# Patient Record
Sex: FEMALE | Race: WHITE
[De-identification: names, ages, dates, MRNs, and addresses within clinical notes are randomized per-mention and may not be internally consistent; named-entity substitution may affect disease eponyms.]

---

## 2018-04-23 ENCOUNTER — HOSPITAL ENCOUNTER (INPATIENT)
Dept: HOSPITAL 7 - FB.ED | Age: 78
LOS: 7 days | Discharge: HOME HEALTH | DRG: 641 | End: 2018-04-30
Attending: FAMILY MEDICINE | Admitting: EMERGENCY MEDICINE
Payer: MEDICARE

## 2018-04-23 DIAGNOSIS — Z66: ICD-10-CM

## 2018-04-23 DIAGNOSIS — R13.10: ICD-10-CM

## 2018-04-23 DIAGNOSIS — F71: ICD-10-CM

## 2018-04-23 DIAGNOSIS — G91.9: ICD-10-CM

## 2018-04-23 DIAGNOSIS — E87.0: Primary | ICD-10-CM

## 2018-04-23 DIAGNOSIS — H35.30: ICD-10-CM

## 2018-04-23 DIAGNOSIS — G40.209: ICD-10-CM

## 2018-04-23 DIAGNOSIS — R41.82: ICD-10-CM

## 2018-04-23 DIAGNOSIS — Z87.820: ICD-10-CM

## 2018-04-23 DIAGNOSIS — G70.9: ICD-10-CM

## 2018-04-23 DIAGNOSIS — R74.8: ICD-10-CM

## 2018-04-23 DIAGNOSIS — E86.0: ICD-10-CM

## 2018-04-23 DIAGNOSIS — I50.9: ICD-10-CM

## 2018-04-23 DIAGNOSIS — I27.20: ICD-10-CM

## 2018-04-23 DIAGNOSIS — M19.90: ICD-10-CM

## 2018-04-23 DIAGNOSIS — E78.5: ICD-10-CM

## 2018-04-23 DIAGNOSIS — N17.9: ICD-10-CM

## 2018-04-23 DIAGNOSIS — R05: ICD-10-CM

## 2018-04-23 PROCEDURE — 36415 COLL VENOUS BLD VENIPUNCTURE: CPT

## 2018-04-23 PROCEDURE — 71045 X-RAY EXAM CHEST 1 VIEW: CPT

## 2018-04-23 PROCEDURE — 96360 HYDRATION IV INFUSION INIT: CPT

## 2018-04-23 PROCEDURE — 83880 ASSAY OF NATRIURETIC PEPTIDE: CPT

## 2018-04-23 PROCEDURE — 99285 EMERGENCY DEPT VISIT HI MDM: CPT

## 2018-04-23 PROCEDURE — 80048 BASIC METABOLIC PNL TOTAL CA: CPT

## 2018-04-23 PROCEDURE — 70450 CT HEAD/BRAIN W/O DYE: CPT

## 2018-04-23 PROCEDURE — 85025 COMPLETE CBC W/AUTO DIFF WBC: CPT

## 2018-04-23 RX ADMIN — Medication PRN ML: at 18:01

## 2018-04-23 NOTE — EDM.PDOC
ED HPI GENERAL MEDICAL PROBLEM





- General


Stated Complaint: NOT GETTING BETTER FROM PNEUMONIA


Time Seen by Provider: 04/23/18 17:02


Source of Information: Reports: Patient, Other (care giver)


History Limitations: Reports: Altered Mental Status





- History of Present Illness


INITIAL COMMENTS - FREE TEXT/NARRATIVE: 





77 y.o.w.evans came from a group home to the ed due due "non healing pneumonia" and 

declining mental status to thye point she does not anymore eat, drink and 

communicate. No family is present. As per care give, the family is in [place], 

and the patient is a full code.  /68 Pulse ox 96% Pulse 98 temp 97.4


Onset Date: 04/16/18


Onset Time: 08:00


Duration: Week(s):, Getting Worse


Location: Reports: Generalized (mental status)


Quality: Reports: Other


Severity: Severe


Improves with: Reports: Rest


Worsens with: Reports: Movement


Context: Reports: Other (poor fluid intake)


Associated Symptoms: Reports: Confusion, Cough, Loss of Appetite, Weakness





- Related Data


 Allergies











Allergy/AdvReac Type Severity Reaction Status Date / Time


 


No Known Allergies Allergy   Verified 04/23/18 17:47











Home Meds: 


 Home Meds





Calcium Carbonate [Calcium] 1,500 mg PO DAILY 04/23/18 [History]


Cholecalciferol (Vitamin D3) [Vitamin D3] 2,000 unit PO DAILY 04/23/18 [History]


Fexofenadine [Allegra] 180 mg PO DAILY 04/23/18 [History]


Iron/Multivits,Stress Formula [Stress Formula with Iron] 1 tab PO DAILY 04/23/ 18 [History]


Levofloxacin 750 mg PO DAILY 04/23/18 [History]


Nabumetone [Relafen] 750 mg PO 18 04/23/18 [History]


levETIRAcetam [Keppra] 500 mg PO 10,18 04/23/18 [History]











ED ROS GENERAL





- Review of Systems


Review Of Systems: Unable To Obtain (nonverbal)





- Physical Exam


Exam: See Below


Exam Limited By: Altered Mental Status


General Appearance: Lethargic, Moderate Distress


Eye Exam: Right Eye: Other (Right puplile 2 mm left pupil 4 mm)


Ears: Normal External Exam


Nose: Normal Inspection


Throat/Mouth: Normal Oropharynx, No Airway Compromise, Other (dry mucosal 

membrane)


Head Exam: Atraumatic, Normocephalic


Neck: Normal Inspection, Supple, Non-Tender


Respiratory/Chest: No Respiratory Distress, Lungs Clear, Normal Breath Sounds


Cardiovascular: Normal Peripheral Pulses, Regular Rate, Rhythm, No Edema


GI/Abdominal: Normal Bowel Sounds, Soft, Non-Tender, No Organomegaly


 (Female) Exam: Deferred


Rectal (Female) Exam: Deferred


Neuro Exam (Abbreviated): Inattentive, Other (lethargic)


Back Exam: Normal Inspection, Full Range of Motion


Extremities: Normal Inspection, Normal Range of Motion, Non-Tender, No Pedal 

Edema, Normal Capillary Refill


Psychiatric: Depressed Mood, Flat Affect


Skin Exam: Warm, Dry, Intact, Normal Color, No Rash





Course





- Vital Signs


Text/Narrative:: 


77 y.o.w.f came from a group home to the ed due due "non healing pneumonia" and 

declining mental status to thye point she does not anymore eat, drink and 

communicate. No family is present. As per care give, the family is in [place], 

and the patient is a full code.  /68 Pulse ox 96% Pulse 98 temp 97.4


PE: Thin 77 y.o.w.f with mental status change, not able to communicate, H/O 

pneumonia


Imaging: Head CT: Ventricular size increased, no other acute changes. CXR right 

lower lung infiltrate, official report is pending


Labs: WBC 12.6 HGB 15.8 Na 166  Cr 1.5 DARÍO 54 GFR 33 ProBNP 5057


Impression: Mental status changes, Hypernatremia, Hyperchloremia, dehydration, 

CRI


Tx:  NS. Levoquin i.v


Reexam: Improved some


Plan: Admit to ICU


Last Recorded V/S: 


 Last Vital Signs











Temp  35.1 C L  04/24/18 04:00


 


Pulse  100   04/23/18 23:00


 


Resp  22 H  04/24/18 07:00


 


BP  87/52 L  04/24/18 07:00


 


Pulse Ox  93 L  04/24/18 07:00














- Orders/Labs/Meds


Orders: 


 Active Orders 24 hr











 Category Date Time Status


 


 Patient Status [ADT] Routine ADT  04/23/18 18:23 Active


 


 Oxygen Therapy [RC] PRN Care  04/23/18 18:23 Active


 


 VTE/DVT Education [RC] Per Unit Routine Care  04/23/18 18:23 Active


 


 Vital Signs [RC] Q4H Care  04/23/18 18:23 Active


 


 UA W/MICROSCOPIC [URIN] Stat Lab  04/23/18 23:00 Ordered


 


 Sodium Chloride 0.9% [Saline Flush] Med  04/23/18 18:01 Active





 10 ml FLUSH ASDIRECTED PRN   


 


 Saline Lock Insert [OM.PC] Routine Oth  04/23/18 18:01 Ordered








 Medication Orders





Levofloxacin/Dextrose 750 mg/ (Premix)  150 mls @ 100 mls/hr IV Q48H [facility]


   Last Admin: 04/24/18 09:35  Dose: 100 mls/hr


Sodium Chloride (Normal Saline)  1,000 mls @ 100 mls/hr IV ASDIRECTED CHAZ


   Last Admin: 04/24/18 08:40  Dose: 100 mls/hr


Levetiracetam (Keppra)  500 mg PO 10,18 [facility]


   Last Admin: 04/24/18 10:16  Dose: 500 mg


Sodium Chloride (Saline Flush)  10 ml FLUSH ASDIRECTED PRN


   PRN Reason: Keep Vein Open


   Last Admin: 04/23/18 18:01  Dose: 10 ml








Labs: 


 Laboratory Tests











  04/23/18 04/23/18 04/23/18 Range/Units





  17:58 17:58 17:58 


 


WBC  12.1 H    (4.5-12.0)  X10-3/uL


 


RBC  5.45 H    (3.23-5.20)  x10(6)uL


 


Hgb  15.8 H    (11.5-15.5)  g/dL


 


Hct  49.9    (30.0-51.3)  %


 


MCV  91.6    (80-96)  fL


 


MCH  29.0    (27.7-33.6)  pg


 


MCHC  31.6 L    (32.2-35.4)  g/dL


 


RDW  14.5    (11.5-15.5)  %


 


Plt Count  176    (125-369)  X10(3)uL


 


MPV  10.6 H    (7.4-10.4)  fL


 


Neut % (Auto)  74.0    (46-82)  %


 


Lymph % (Auto)  14.0    (13-37)  %


 


Mono % (Auto)  8.5    (4-12)  %


 


Eos % (Auto)  3    (1.0-5.0)  %


 


Baso % (Auto)  1    (0-2)  %


 


Neut # (Auto)  9.0 H    (1.6-8.3)  #


 


Lymph # (Auto)  1.7    (0.6-5.0)  #


 


Mono # (Auto)  1.0    (0.0-1.3)  #


 


Eos # (Auto)  0.3    (0.0-0.8)  #


 


Baso # (Auto)  0.1    (0.0-0.2)  #


 


Sodium   166 H*   (135-145)  mmol/L


 


Potassium   4.0   (3.5-5.3)  mmol/L


 


Chloride   125 H*   (100-110)  mmol/L


 


Carbon Dioxide   30   (21-32)  mmol/L


 


BUN   54 H   (7-18)  mg/dL


 


Creatinine   1.5 H   (0.55-1.02)  mg/dL


 


Est Cr Clr Drug Dosing   TNP   


 


Estimated GFR (MDRD)   33 L   (>60)  


 


BUN/Creatinine Ratio   36.0 H   (9-20)  


 


Glucose   98   ()  mg/dL


 


Calcium   10.7 H   (8.6-10.2)  mg/dL


 


NT-Pro-B Natriuret Pep    5405 H*  (<=450)  pg/mL











Meds: 


Medications











Generic Name Dose Route Start Last Admin





  Trade Name Freq  PRN Reason Stop Dose Admin


 


Levofloxacin/Dextrose 750 mg/  150 mls @ 100 mls/hr  04/24/18 09:00  04/24/18 09

:35





  Premix  IV   100 mls/hr





  Q48H CHAZ   Administration





     





     





     





     


 


Sodium Chloride  1,000 mls @ 100 mls/hr  04/24/18 08:45  04/24/18 08:40





  Normal Saline  IV   100 mls/hr





  ASDIRECTED CHAZ   Administration





     





     





     





     


 


Levetiracetam  500 mg  04/24/18 10:00  04/24/18 10:16





  Keppra  PO   500 mg





  10,18 CHAZ   Administration





     





     





     





     


 


Sodium Chloride  10 ml  04/23/18 18:01  04/23/18 18:01





  Saline Flush  FLUSH   10 ml





  ASDIRECTED PRN   Administration





  Keep Vein Open   





     





     





     














Discontinued Medications














Generic Name Dose Route Start Last Admin





  Trade Name Freq  PRN Reason Stop Dose Admin


 


Sodium Chloride  1,000 mls @ 999 mls/hr  04/23/18 17:25  04/23/18 18:00





  Normal Saline  IV  04/23/18 18:25  999 mls/hr





  .BOLUS ONE   Administration





     





     





     





     


 


Sodium Chloride  1,000 mls @ 999 mls/hr  04/23/18 18:30  04/23/18 19:04





  Sodium Chloride 0.45%  IV   999 mls/hr





  ASDIRECTED CHAZ   Administration





     





     





     





     


 


Sodium Chloride  1,000 mls @ 125 mls/hr  04/23/18 20:00  04/24/18 04:33





  Sodium Chloride 0.45%  IV   125 mls/hr





  ASDIRECTED CHAZ   Administration





     





     





     





     














Departure





- Departure


Time of Disposition: 22:00


Disposition: Admitted As Inpatient 64


Condition: Fair


Clinical Impression: 


 Hypernatremia








- Discharge Information





- My Orders


Last 24 Hours: 


My Active Orders





04/23/18 18:01


Sodium Chloride 0.9% [Saline Flush]   10 ml FLUSH ASDIRECTED PRN 


Saline Lock Insert [OM.PC] Routine 





04/23/18 18:23


Patient Status [ADT] Routine 


Oxygen Therapy [RC] PRN 


VTE/DVT Education [RC] Per Unit Routine 


Vital Signs [RC] Q4H 





04/23/18 23:00


UA W/MICROSCOPIC [URIN] Stat 














- Assessment/Plan


Last 24 Hours: 


My Active Orders





04/23/18 18:01


Sodium Chloride 0.9% [Saline Flush]   10 ml FLUSH ASDIRECTED PRN 


Saline Lock Insert [OM.PC] Routine 





04/23/18 18:23


Patient Status [ADT] Routine 


Oxygen Therapy [RC] PRN 


VTE/DVT Education [RC] Per Unit Routine 


Vital Signs [RC] Q4H 





04/23/18 23:00


UA W/MICROSCOPIC [URIN] Stat

## 2018-04-24 RX ADMIN — HEPARIN SODIUM SCH UNITS: 5000 INJECTION, SOLUTION INTRAVENOUS; SUBCUTANEOUS at 14:35

## 2018-04-24 NOTE — PCM.HP
H&P History of Present Illness





- General


Date of Service: 04/24/18


Admit Problem/Dx: 


 Admission Diagnosis/Problem





Admission Diagnosis/Problem      Hypernatremia








Source of Information: Family, Old Records, Provider





- History of Present Illness


Initial Comments - Free Text/Narative: 


Chief complaint: Altered mental status, hypernatremia





Patient is a 76 yo mentally handicapped female with complex partial epilepsy 

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 

fatigue and confusion.  A urinalysis was ordered but not collected and while 

waiting on this patient was brought into the clinic on 4/20 and seen by a 

provider. She wasn't eating well, was not as responsive, they had a difficult 

time collecting the urine sample and when brought into the clinic her blood 

pressure was 96/70 with a pulse of 102. Was afebrile but had rales on the right 

lower lobe. Blood work showed a white count of almost 17,000, hemoglobin 16.4, 

significant left shift of 14.4. Test x-ray was done and showed possible 

bibasilar pneumonia, nodular mass in the mediastinum on the right. CT scan was 

recommended for further evaluation. Patient was treated with levofloxacin 750 

mg by mouth daily for 5 days and discharged from the clinic back to the 

facility. She presented to the emergency department on the day of admission 

with increased lethargy, not taking by mouth intake, essentially fairly 

nonresponsive. The patient was found to have a sodium of 166, creatinine of 1.5

, and was admitted for severe dehydration and hypernatremia.





Patient normally can speak but is not oriented in her speech. She is 

interactive with caregivers. She is nonambulatory at the facility. She 

sometimes takes with her brother on the phone but is unable to have a 

conversation. Usually is able to eat with assistance and drink with assistance.





Past mental history:


#1 hyperlipidemia


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 

injury as a child.


#3 Hydrocephalus with a shunt in place


#4 Epilepsy, complex partial as a result of head injury.


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


#6 Osteoarthritis


 #7 Macular degeneration





Social history:


Patient lives in a group home. Her brother Leopoldo is her guardian and power of 

. She previously lived with her mother up until her mother passed away 

about 10 years ago. Nonsmoker, nondrinker. Single, no children.





Family history:


Noncontributory











- Related Data


Allergies/Adverse Reactions: 


 Allergies











Allergy/AdvReac Type Severity Reaction Status Date / Time


 


No Known Allergies Allergy   Verified 04/23/18 17:47











Home Medications: 


 Home Meds





Calcium Carbonate [Calcium] 1,500 mg PO DAILY 04/23/18 [History]


Cholecalciferol (Vitamin D3) [Vitamin D3] 2,000 unit PO DAILY 04/23/18 [History]


Fexofenadine [Allegra] 180 mg PO DAILY 04/23/18 [History]


Iron/Multivits,Stress Formula [Stress Formula with Iron] 1 tab PO DAILY 04/23/ 18 [History]


Levofloxacin 750 mg PO DAILY 04/23/18 [History]


Nabumetone [Relafen] 750 mg PO 18 04/23/18 [History]


levETIRAcetam [Keppra] 500 mg PO 10,18 04/23/18 [History]











Past Medical History


HEENT History: Reports: Cataract, Macular Degeneration, Other (See Below)


Other HEENT History: ptosis of the eyelid


Cardiovascular History: Reports: High Cholesterol, Other (See Below)


Other Cardiovascular History: tachacardia


Respiratory History: Reports: Pulmonary Fibrosis


Genitourinary History: Reports: Urinary Incontinence


Musculoskeletal History: Reports: Arthritis, Osteoporosis


Neurological History: Reports: Seizure, Other (See Below)


Other Neuro History: Hydrocephalus


Psychiatric History: Reports: Developmental Delay





Social & Family History





- Family History


Family Medical History: Noncontributory





- Tobacco Use


Smoking Status *Q: Unknown Ever Smoked


Second Hand Smoke Exposure: No





- Caffeine Use


Caffeine Use: Reports: Other


Other Caffeine Use: unknown





- Recreational Drug Use


Recreational Drug Use: No





H&P Review of Systems





- Review of Systems:


Review Of Systems: Unable To Obtain (See ROS)





Exam





- Exam


Exam: See Below





- Vital Signs


Vital Signs: 


 Last Vital Signs











Temp  35.6 C   04/24/18 08:00


 


Pulse  99   04/24/18 11:00


 


Resp  20   04/24/18 11:00


 


BP  99/60   04/24/18 11:00


 


Pulse Ox  91 L  04/24/18 11:00











Weight: 48.807 kg





- Exam


General: Lethargic


HEENT: PERRLA, Conjunctiva Clear (mucous membranes dry.  Eyes somewhat sunken.  

Does smile and make eye contact but doesn't verbally respond.  Doesn't follow 

commands but does move spontaneously.  Asymmetric smile with mild droop on the 

left. )


Neck: Supple


Lungs: Clear to Auscultation, Normal Respiratory Effort


Cardiovascular: Regular Rate, Regular Rhythm, Normal S1, Normal S2


GI/Abdominal Exam: Normal Bowel Sounds, Soft, Non-Tender, No Distention


Extremities: No Pedal Edema





- Patient Data


Lab Results Last 24 hrs: 


 Laboratory Results - last 24 hr











  04/23/18 04/23/18 04/23/18 Range/Units





  17:58 17:58 17:58 


 


WBC  12.1 H    (4.5-12.0)  X10-3/uL


 


RBC  5.45 H    (3.23-5.20)  x10(6)uL


 


Hgb  15.8 H    (11.5-15.5)  g/dL


 


Hct  49.9    (30.0-51.3)  %


 


MCV  91.6    (80-96)  fL


 


MCH  29.0    (27.7-33.6)  pg


 


MCHC  31.6 L    (32.2-35.4)  g/dL


 


RDW  14.5    (11.5-15.5)  %


 


Plt Count  176    (125-369)  X10(3)uL


 


MPV  10.6 H    (7.4-10.4)  fL


 


Neut % (Auto)  74.0    (46-82)  %


 


Lymph % (Auto)  14.0    (13-37)  %


 


Mono % (Auto)  8.5    (4-12)  %


 


Eos % (Auto)  3    (1.0-5.0)  %


 


Baso % (Auto)  1    (0-2)  %


 


Neut # (Auto)  9.0 H    (1.6-8.3)  #


 


Lymph # (Auto)  1.7    (0.6-5.0)  #


 


Mono # (Auto)  1.0    (0.0-1.3)  #


 


Eos # (Auto)  0.3    (0.0-0.8)  #


 


Baso # (Auto)  0.1    (0.0-0.2)  #


 


Sodium   166 H*   (135-145)  mmol/L


 


Potassium   4.0   (3.5-5.3)  mmol/L


 


Chloride   125 H*   (100-110)  mmol/L


 


Carbon Dioxide   30   (21-32)  mmol/L


 


BUN   54 H   (7-18)  mg/dL


 


Creatinine   1.5 H   (0.55-1.02)  mg/dL


 


Est Cr Clr Drug Dosing   TNP   


 


Estimated GFR (MDRD)   33 L   (>60)  


 


BUN/Creatinine Ratio   36.0 H   (9-20)  


 


Glucose   98   ()  mg/dL


 


Calcium   10.7 H   (8.6-10.2)  mg/dL


 


Total Bilirubin     (0.1-1.3)  mg/dL


 


AST     (5-25)  IU/L


 


ALT     (12-36)  U/L


 


Alkaline Phosphatase     ()  IU/L


 


Troponin I     (<0.017-0.056)  ng/mL


 


NT-Pro-B Natriuret Pep    5405 H*  (<=450)  pg/mL


 


Total Protein     (6.0-8.0)  g/dL


 


Albumin     (3.2-4.6)  g/dL


 


Globulin     g/dL


 


Albumin/Globulin Ratio     


 


Urine Color     (YELLOW)  


 


Urine Appearance     (CLEAR)  


 


Urine pH     (5.0-6.5)  


 


Ur Specific Gravity     (1.010-1.025)  


 


Urine Protein     (NEGATIVE)  mg/dL


 


Urine Glucose (UA)     (NEGATIVE)  mg/dL


 


Urine Ketones     (NEGATIVE)  mg/dL


 


Urine Occult Blood     (NEGATIVE)  


 


Urine Nitrite     (NEGATIVE)  


 


Urine Bilirubin     (NEGATIVE)  


 


Urine Urobilinogen     (NEGATIVE)  mg/dL


 


Ur Leukocyte Esterase     (NEGATIVE)  


 


Urine RBC     (0)  


 


Urine WBC     (0)  


 


Ur Squamous Epith Cells     (NS,R,O)  


 


Amorphous Sediment     


 


Urine Bacteria     (NS)  














  04/23/18 04/24/18 04/24/18 Range/Units





  23:00 08:00 08:00 


 


WBC   8.4   (4.5-12.0)  X10-3/uL


 


RBC   4.41   (3.23-5.20)  x10(6)uL


 


Hgb   13.3   (11.5-15.5)  g/dL


 


Hct   40.3   (30.0-51.3)  %


 


MCV   91.2   (80-96)  fL


 


MCH   30.0   (27.7-33.6)  pg


 


MCHC   32.9   (32.2-35.4)  g/dL


 


RDW   14.6   (11.5-15.5)  %


 


Plt Count   116 L   (125-369)  X10(3)uL


 


MPV   10.0   (7.4-10.4)  fL


 


Neut % (Auto)   68.4   (46-82)  %


 


Lymph % (Auto)   18.0   (13-37)  %


 


Mono % (Auto)   6.5   (4-12)  %


 


Eos % (Auto)   7 H   (1.0-5.0)  %


 


Baso % (Auto)   1   (0-2)  %


 


Neut # (Auto)   5.8   (1.6-8.3)  #


 


Lymph # (Auto)   1.5   (0.6-5.0)  #


 


Mono # (Auto)   0.5   (0.0-1.3)  #


 


Eos # (Auto)   0.6   (0.0-0.8)  #


 


Baso # (Auto)   0.0   (0.0-0.2)  #


 


Sodium    154 H D  (135-145)  mmol/L


 


Potassium    3.8  (3.5-5.3)  mmol/L


 


Chloride    121 H*  (100-110)  mmol/L


 


Carbon Dioxide    24  (21-32)  mmol/L


 


BUN    38 H D  (7-18)  mg/dL


 


Creatinine    1.1 H  (0.55-1.02)  mg/dL


 


Est Cr Clr Drug Dosing    29.79  


 


Estimated GFR (MDRD)    48 L  (>60)  


 


BUN/Creatinine Ratio    34.5 H  (9-20)  


 


Glucose    85  ()  mg/dL


 


Calcium    8.4 L D  (8.6-10.2)  mg/dL


 


Total Bilirubin    0.8  (0.1-1.3)  mg/dL


 


AST    < 5 L  (5-25)  IU/L


 


ALT    31  (12-36)  U/L


 


Alkaline Phosphatase    76  ()  IU/L


 


Troponin I     (<0.017-0.056)  ng/mL


 


NT-Pro-B Natriuret Pep     (<=450)  pg/mL


 


Total Protein    5.4 L  (6.0-8.0)  g/dL


 


Albumin    2.3 L  (3.2-4.6)  g/dL


 


Globulin    3.1  g/dL


 


Albumin/Globulin Ratio    0.7  


 


Urine Color  Yellow    (YELLOW)  


 


Urine Appearance  Clear    (CLEAR)  


 


Urine pH  5.0    (5.0-6.5)  


 


Ur Specific Gravity  1.020    (1.010-1.025)  


 


Urine Protein  Negative    (NEGATIVE)  mg/dL


 


Urine Glucose (UA)  Normal    (NEGATIVE)  mg/dL


 


Urine Ketones  Negative    (NEGATIVE)  mg/dL


 


Urine Occult Blood  Negative    (NEGATIVE)  


 


Urine Nitrite  Negative    (NEGATIVE)  


 


Urine Bilirubin  Negative    (NEGATIVE)  


 


Urine Urobilinogen  Normal    (NEGATIVE)  mg/dL


 


Ur Leukocyte Esterase  Negative    (NEGATIVE)  


 


Urine RBC  0-5    (0)  


 


Urine WBC  0-5    (0)  


 


Ur Squamous Epith Cells  Few H    (NS,R,O)  


 


Amorphous Sediment  Few    


 


Urine Bacteria  Few H    (NS)  














  04/24/18 Range/Units





  08:00 


 


WBC   (4.5-12.0)  X10-3/uL


 


RBC   (3.23-5.20)  x10(6)uL


 


Hgb   (11.5-15.5)  g/dL


 


Hct   (30.0-51.3)  %


 


MCV   (80-96)  fL


 


MCH   (27.7-33.6)  pg


 


MCHC   (32.2-35.4)  g/dL


 


RDW   (11.5-15.5)  %


 


Plt Count   (125-369)  X10(3)uL


 


MPV   (7.4-10.4)  fL


 


Neut % (Auto)   (46-82)  %


 


Lymph % (Auto)   (13-37)  %


 


Mono % (Auto)   (4-12)  %


 


Eos % (Auto)   (1.0-5.0)  %


 


Baso % (Auto)   (0-2)  %


 


Neut # (Auto)   (1.6-8.3)  #


 


Lymph # (Auto)   (0.6-5.0)  #


 


Mono # (Auto)   (0.0-1.3)  #


 


Eos # (Auto)   (0.0-0.8)  #


 


Baso # (Auto)   (0.0-0.2)  #


 


Sodium   (135-145)  mmol/L


 


Potassium   (3.5-5.3)  mmol/L


 


Chloride   (100-110)  mmol/L


 


Carbon Dioxide   (21-32)  mmol/L


 


BUN   (7-18)  mg/dL


 


Creatinine   (0.55-1.02)  mg/dL


 


Est Cr Clr Drug Dosing   


 


Estimated GFR (MDRD)   (>60)  


 


BUN/Creatinine Ratio   (9-20)  


 


Glucose   ()  mg/dL


 


Calcium   (8.6-10.2)  mg/dL


 


Total Bilirubin   (0.1-1.3)  mg/dL


 


AST   (5-25)  IU/L


 


ALT   (12-36)  U/L


 


Alkaline Phosphatase   ()  IU/L


 


Troponin I  0.239 H*  (<0.017-0.056)  ng/mL


 


NT-Pro-B Natriuret Pep   (<=450)  pg/mL


 


Total Protein   (6.0-8.0)  g/dL


 


Albumin   (3.2-4.6)  g/dL


 


Globulin   g/dL


 


Albumin/Globulin Ratio   


 


Urine Color   (YELLOW)  


 


Urine Appearance   (CLEAR)  


 


Urine pH   (5.0-6.5)  


 


Ur Specific Gravity   (1.010-1.025)  


 


Urine Protein   (NEGATIVE)  mg/dL


 


Urine Glucose (UA)   (NEGATIVE)  mg/dL


 


Urine Ketones   (NEGATIVE)  mg/dL


 


Urine Occult Blood   (NEGATIVE)  


 


Urine Nitrite   (NEGATIVE)  


 


Urine Bilirubin   (NEGATIVE)  


 


Urine Urobilinogen   (NEGATIVE)  mg/dL


 


Ur Leukocyte Esterase   (NEGATIVE)  


 


Urine RBC   (0)  


 


Urine WBC   (0)  


 


Ur Squamous Epith Cells   (NS,R,O)  


 


Amorphous Sediment   


 


Urine Bacteria   (NS)  











Result Diagrams: 


 04/24/18 08:00





 04/24/18 08:00


Imaging Impressions Last 24 hrs: 


I ordered a chest CT this morning to further evaluate for possible infectious 

etiology and to look at this area of a mass in the right suprahilar region. 

Final report showed pulmonary artery dilatation which is causing the appearance 

of a mass, suggest pulmonary hypertension. ASHD with mild cardiomegaly. 

Multiple areas of heavy markings in the lungs, likely fibrotic in nature, 

although minimal patchy pneumonia is difficult to entirely exclude in some of 

these areas. No gross consolidating pneumonia or effusion seen.








EKG INTERPRETATION


EKG Date: 04/24/18


Rhythm: NSR


EKG Interpretation Comments: 


Normal sinus rhythm, rate 96, no ST elevation or depression but the patient has 

flipped T waves across all leads. The QTc interval prolonged at 495. The 

patient has IVCD. No prior for comparison.








- Problem List


(1) Hypernatremia


SNOMED Code(s): 98613487


   ICD Code: E87.0 - HYPEROSMOLALITY AND HYPERNATREMIA   Status: Acute   

Current Visit: Yes   Problem Details: And has severe dehydration and free water 

deficit. She was started on normal saline in the emergency department and then 

changed to half normal saline after fluid bolus. Given that she has already 

dropped about 12 points in about 12 hours I'm going to put her back on normal 

saline and continue fluids at 100 mL an hour to slow the decline of her sodium. 

Patient has already started to improve her mental status and she is now 

arousable and making some sounds although not speaking. Recheck sodium at 1600.

   





(2) Pneumonia


SNOMED Code(s): 233002891


   ICD Code: J18.9 - PNEUMONIA, UNSPECIFIED ORGANISM   Status: Acute   Current 

Visit: Yes   Problem Details: Patient has on CT question of patchy infiltrate 

that has been treated already with 5 days of Levaquin. I'm going to hold any 

further antibiotic therapy.   





(3) Elevated troponin


SNOMED Code(s): 672519511, 416857671, 740277371


   ICD Code: R74.8 - ABNORMAL LEVELS OF OTHER SERUM ENZYMES   Status: Acute   

Current Visit: Yes   Problem Details: Certainly non-ST elevation MI could be 

the potential etiology for the patient's initial presentation of lethargy. We'

ll repeat troponin this afternoon. I am going to start the patient on low-dose 

beta blocker metoprolol 12.5 mg and aspirin.   





(4) Seizure disorder, complex partial


SNOMED Code(s): 480559547


   ICD Code: G40.209 - LOCAL-REL SYMPTC EPI W CMPLX PRT SEIZ,NOT NTRCT,W/O STAT 

EPI   Status: Acute   Current Visit: Yes   Problem Details: Continue keppra at 

outpatient dose.   


Qualifiers: 


   Epilepsy type: partial symptomatic 





(5) Acute kidney injury


SNOMED Code(s): 78307360


   ICD Code: N17.9 - ACUTE KIDNEY FAILURE, UNSPECIFIED   Status: Acute   

Current Visit: Yes   Problem Details: Baseline creatinine 0.94 but hasn't been 

checked since 3/2017.  Monitor.   





(6) Moderate mental retardation


SNOMED Code(s): 923410673, 977530475


   ICD Code: F71 - MODERATE INTELLECTUAL DISABILITIES   Status: Acute   Current 

Visit: Yes   Problem Details: Work with staff at group home to learn how to 

meet patient's psychosocial and physical needs.     





(7) DVT prophylaxis


SNOMED Code(s): 089675565, 013294004


   ICD Code: SPB1835 -    Status: Acute   Current Visit: Yes   Problem Details: 

Heparin BID due to renal injury.  SCDs.   


Problem List Initiated/Reviewed/Updated: Yes


Orders Last 24hrs: 


 Active Orders 24 hr











 Category Date Time Status


 


 Patient Status [ADT] Routine ADT  04/23/18 18:23 Active


 


 Patient Status [ADT] Routine ADT  04/23/18 18:59 Active


 


 Activity as Tolerated [RC] 08,12,18 Care  04/24/18 10:13 Active


 


 Cardiac Monitoring [RC] CONTINUOUS Care  04/23/18 19:02 Active


 


 Height and Weight [RC] DAILY Care  04/24/18 13:47 Ordered


 


 Intake and Output [RC] QSHIFT Care  04/24/18 13:48 Ordered


 


 Oxygen Therapy [RC] PRN Care  04/23/18 18:23 Active


 


 Oxygen Therapy [RC] PRN Care  04/23/18 18:59 Active


 


 Oxygen Therapy [RC] PRN Care  04/24/18 13:47 Ordered


 


 Pulse Oximetry [RC] PRN Care  04/24/18 13:49 Ordered


 


 Up ad Caron [RC] ASDIRECTED Care  04/24/18 13:47 Ordered


 


 Up to Chair [RC] ASDIRECTED Care  04/24/18 13:47 Ordered


 


 VTE/DVT Education [RC] Per Unit Routine Care  04/23/18 18:23 Active


 


 VTE/DVT Education [RC] Per Unit Routine Care  04/23/18 18:59 Active


 


 VTE/DVT Education [RC] Per Unit Routine Care  04/24/18 13:47 Ordered


 


 Vital Signs [RC] Q4H Care  04/23/18 18:23 Active


 


 Vital Signs [RC] Q4H Care  04/24/18 13:47 Ordered


 


 Mechanical Soft Diet [DIET] Diet  04/24/18 Breakfast Ordered


 


 BASIC METABOLIC PANEL,BMP [CHEM] AM Lab  04/25/18 05:11 Ordered


 


 CBC WITH AUTO DIFF [HEME] AM Lab  04/25/18 05:11 Ordered


 


 UA W/MICROSCOPIC [URIN] Stat Lab  04/23/18 23:00 Ordered


 


 Heparin Sodium Med  04/24/18 14:00 Ordered





 5,000 units SUBCUT Q12H   


 


 Levofloxacin/Dextrose 5%-Water [Levaquin in D5W 750 MG/ Med  04/24/18 09:00 

Active





 150 ML] 750 mg   





 Premix Bag 1 bag   





 IV Q48H   


 


 Sodium Chloride 0.9% [Normal Saline] 1,000 ml Med  04/24/18 08:45 Active





 IV ASDIRECTED   


 


 Sodium Chloride 0.9% [Saline Flush] Med  04/23/18 18:01 Active





 10 ml FLUSH ASDIRECTED PRN   


 


 levETIRAcetam [Keppra] Med  04/24/18 10:00 Active





 500 mg PO 10,18   


 


 Saline Lock Insert [OM.PC] Routine Oth  04/23/18 18:01 Ordered


 


 Sequential Compression Device [OM.PC] Per Unit Routine Oth  04/24/18 13:49 

Ordered


 


 Resuscitation Status Routine Resus Stat  04/24/18 13:47 Ordered


 


 EKG 12 Lead [EK] Stat Ther  04/24/18 08:10 Ordered








 Medication Orders





Levofloxacin/Dextrose 750 mg/ (Premix)  150 mls @ 100 mls/hr IV Q48H [facility]


   Last Admin: 04/24/18 09:35  Dose: 100 mls/hr


Sodium Chloride (Normal Saline)  1,000 mls @ 100 mls/hr IV ASDIRECTED [facility]


   Last Admin: 04/24/18 08:40  Dose: 100 mls/hr


Levetiracetam (Keppra)  500 mg PO 10,18 [facility]


   Last Admin: 04/24/18 10:16  Dose: 500 mg


Sodium Chloride (Saline Flush)  10 ml FLUSH ASDIRECTED PRN


   PRN Reason: Keep Vein Open


   Last Admin: 04/23/18 18:01  Dose: 10 ml

## 2018-04-24 NOTE — CT
INDICATION:  Mental status changes.  



CT HEAD WITHOUT CONTRAST:  Serial contiguous 2.5 and 5-mm sections were 
obtained through the brain without contrast, 04/23/2018, and compared with 06/23
/2009.



Total Exam DLP = 1094.32 mGy-cm. 



A shunt tube is again noted in place at the foramen magnum extending through 
the posterior fossa, into the posteroparietal area and then into the temporal 
area on the left.  



No other cranial abnormality was identified.  There is grove for the tube in 
the occipital bone.  



There appears to be a small air-fluid level in the left maxillary antrum.  
Paranasal sinuses were otherwise unremarkable.  Mastoid air cells are very 
minimal in number. 



Degenerative changes are noted at the atlantoodontoid joint.  



Calcifications are noted in the vertebral and internal carotid arteries as 
previously, and appear more prominent at this time.  



Massively prominent lateral ventricles are again noted, much larger on the left 
than right, with the termination of the shunt tube at the temporal horn of the 
left lateral ventricle.  No shift of midline structures was identified.  A 
slight increase in size of the lateral ventricles is suggested compared with 
the previous study of 2009.  Cortical sulci appear similar.  No other 
significant interval change or acute process, such as a bleeding site or 
hematoma could be identified.  



IMPRESSION:  

1.  No definite acute abnormality.  However, the lateral ventricles appear to 
be slightly more prominent than on the previous study.  

2.  Shunt tube remains in place.

3.  Cerebrovascular disease is present with arterial calcifications slightly 
more prominent in the vertebral and internal carotid arteries.  

4.  Question sinusitis left maxillary antrum.  



Report was called to Dr. Sherwood at 1833 hours, 04/23/2018

[facility]

## 2018-04-24 NOTE — CT
INDICATION:  Abnormal chest x-ray, non-resolving pneumonia.  Chronic renal 
failure. 



COMPUTERIZED TOMOGRAPHY OF THE CHEST WITHOUT CONTRAST:  Spiral 2.5-mm axial 
sections were obtained through the chest with sagittal and coronal 
reconstructions, 04/24/2018.  Comparison chest x-ray from 04/23/2018 is noted.  



Total Exam DLP = 256.59 mGy-cm. 



Moderate apical scarring is noted on the right, minimal on the left.  On the 
right, the scarring extends into the suprahilar area.  



What appears to be fibrosis is noted in the lingula.  The possibility of a mass 
in that area is difficult to entirely exclude, but is felt to be less likely 
than fibrosis.  



There also appears to be fibrosis in the middle lobe of mild degree.  Minimal 
patchy pneumonia is difficult to entirely exclude in some of these areas of 
probable fibrosis.  However, no consolidating pneumonia or effusion was 
identified.  



No nodular masses are noted in the lungs, concepcion, or mediastinum.  The appearance 
of a mass in the area of the azygos node - suprahilar on the right - is 
produced by a dilated right main pulmonary artery, which measured 32 mm 
transversely.  The left is also dilated, measuring 34 mm.  



The main pulmonary artery measured 42 mm, which is also significantly enlarged, 
the upper limits of normal being approximately 29 mm.  No mediastinal masses 
were seen. 



Calcifications are noted in the aorta, which is normal in caliber.  



Calcifications are also noted in coronary arteries with suggestion of mild 
cardiac enlargement.  No gross abnormality is noted in the upper abdomen 
included on the study.  



IMPRESSION: 

1.  Pulmonary artery dilatation responsible for the appearance of a mass in the 
right suprahilar - azygos node area.  Pulmonary hypertension is suggested with 
this appearance.  Exact etiology is indeterminate.  

2.  ASHD with mild cardiomegaly.

3.  Multiple areas of heavy markings in the lungs, likely fibrotic in nature, 
although minimal patchy pneumonia is difficult to entirely exclude in some of 
these areas.  No gross consolidating pneumonia or effusion was seen.  

4.  ASD.  
MTDD

## 2018-04-24 NOTE — CR
INDICATION:  Mental status changes.  



CHEST:  Portable AP upright view of the chest 04/23/2018 was compared with 04/20
/2018 image from the clinic and revealed an appearance of increased prominence 
of upper lung field pulmonary vasculature, raising question of a mild degree of 
CHF or pulmonary vascular congestion from other etiology.  The heart does 
appear to be somewhat enlarged, compatible with CHF.  



The aorta is tortuous and calcified in the arch area.  



Mass density is noted at the azygos node area.  This is unchanged.  



IMPRESSION: 

1.  ASHD with cardiomegaly, possible mild or early CHF.  Minimal interstitial 
lung edema versus fibrosis also. 

2.  ? Large mass at the azygos node area - suprahilar on the right - neoplasia  
vs. Pulmonary artery dilatation.  
MTDD

## 2018-04-25 RX ADMIN — HEPARIN SODIUM SCH UNITS: 5000 INJECTION, SOLUTION INTRAVENOUS; SUBCUTANEOUS at 01:28

## 2018-04-25 NOTE — PCM.PN
- General Info


Date of Service: 04/25/18


Subjective Update: 


Patient much improved today with mental status. She responds verbally to me 

although I don't understand what she is saying all the time. She talks about 

having photos taken to give pictures to the children. She does not follow 

commands but is smiling and cheerful.








- Patient Data


Vitals - Most Recent: 


 Last Vital Signs











Temp  35.4 C   04/25/18 04:00


 


Pulse  75   04/25/18 04:00


 


Resp  20   04/25/18 04:00


 


BP  96/64   04/25/18 06:30


 


Pulse Ox  97   04/25/18 04:00











Weight - Most Recent: 49.714 kg


I&O - Last 24 Hours: 


 Intake & Output











 04/24/18 04/25/18 04/25/18





 22:59 06:59 14:59


 


Intake Total 790 810 


 


Output Total 325 496 


 


Balance 465 314 











Lab Results Last 24 Hours: 


 Laboratory Results - last 24 hr











  04/24/18 04/24/18 04/24/18 Range/Units





  08:00 08:00 08:00 


 


WBC  8.4    (4.5-12.0)  X10-3/uL


 


RBC  4.41    (3.23-5.20)  x10(6)uL


 


Hgb  13.3    (11.5-15.5)  g/dL


 


Hct  40.3    (30.0-51.3)  %


 


MCV  91.2    (80-96)  fL


 


MCH  30.0    (27.7-33.6)  pg


 


MCHC  32.9    (32.2-35.4)  g/dL


 


RDW  14.6    (11.5-15.5)  %


 


Plt Count  116 L    (125-369)  X10(3)uL


 


MPV  10.0    (7.4-10.4)  fL


 


Neut % (Auto)  68.4    (46-82)  %


 


Lymph % (Auto)  18.0    (13-37)  %


 


Mono % (Auto)  6.5    (4-12)  %


 


Eos % (Auto)  7 H    (1.0-5.0)  %


 


Baso % (Auto)  1    (0-2)  %


 


Neut # (Auto)  5.8    (1.6-8.3)  #


 


Lymph # (Auto)  1.5    (0.6-5.0)  #


 


Mono # (Auto)  0.5    (0.0-1.3)  #


 


Eos # (Auto)  0.6    (0.0-0.8)  #


 


Baso # (Auto)  0.0    (0.0-0.2)  #


 


Sodium   154 H D   (135-145)  mmol/L


 


Potassium   3.8   (3.5-5.3)  mmol/L


 


Chloride   121 H*   (100-110)  mmol/L


 


Carbon Dioxide   24   (21-32)  mmol/L


 


BUN   38 H D   (7-18)  mg/dL


 


Creatinine   1.1 H   (0.55-1.02)  mg/dL


 


Est Cr Clr Drug Dosing   29.79   mL/min


 


Estimated GFR (MDRD)   48 L   (>60)  


 


BUN/Creatinine Ratio   34.5 H   (9-20)  


 


Glucose   85   ()  mg/dL


 


Calcium   8.4 L D   (8.6-10.2)  mg/dL


 


Total Bilirubin   0.8   (0.1-1.3)  mg/dL


 


AST   < 5 L   (5-25)  IU/L


 


ALT   31   (12-36)  U/L


 


Alkaline Phosphatase   76   ()  IU/L


 


Troponin I    0.239 H*  (<0.017-0.056)  ng/mL


 


Total Protein   5.4 L   (6.0-8.0)  g/dL


 


Albumin   2.3 L   (3.2-4.6)  g/dL


 


Globulin   3.1   g/dL


 


Albumin/Globulin Ratio   0.7   














  04/24/18 04/24/18 04/25/18 Range/Units





  16:05 16:05 06:00 


 


WBC    7.6  (4.5-12.0)  X10-3/uL


 


RBC    4.16  (3.23-5.20)  x10(6)uL


 


Hgb    12.6  (11.5-15.5)  g/dL


 


Hct    37.6  (30.0-51.3)  %


 


MCV    90.4  (80-96)  fL


 


MCH    30.3  (27.7-33.6)  pg


 


MCHC    33.5  (32.2-35.4)  g/dL


 


RDW    14.4  (11.5-15.5)  %


 


Plt Count    106 L  (125-369)  X10(3)uL


 


MPV    11.0 H  (7.4-10.4)  fL


 


Neut % (Auto)    70.1  (46-82)  %


 


Lymph % (Auto)    17.1  (13-37)  %


 


Mono % (Auto)    7.5  (4-12)  %


 


Eos % (Auto)    5  (1.0-5.0)  %


 


Baso % (Auto)    0  (0-2)  %


 


Neut # (Auto)    5.3  (1.6-8.3)  #


 


Lymph # (Auto)    1.3  (0.6-5.0)  #


 


Mono # (Auto)    0.6  (0.0-1.3)  #


 


Eos # (Auto)    0.4  (0.0-0.8)  #


 


Baso # (Auto)    0.0  (0.0-0.2)  #


 


Sodium  153 H    (135-145)  mmol/L


 


Potassium     (3.5-5.3)  mmol/L


 


Chloride     (100-110)  mmol/L


 


Carbon Dioxide     (21-32)  mmol/L


 


BUN     (7-18)  mg/dL


 


Creatinine     (0.55-1.02)  mg/dL


 


Est Cr Clr Drug Dosing     mL/min


 


Estimated GFR (MDRD)     (>60)  


 


BUN/Creatinine Ratio     (9-20)  


 


Glucose     ()  mg/dL


 


Calcium     (8.6-10.2)  mg/dL


 


Total Bilirubin     (0.1-1.3)  mg/dL


 


AST     (5-25)  IU/L


 


ALT     (12-36)  U/L


 


Alkaline Phosphatase     ()  IU/L


 


Troponin I   0.150 H*   (<0.017-0.056)  ng/mL


 


Total Protein     (6.0-8.0)  g/dL


 


Albumin     (3.2-4.6)  g/dL


 


Globulin     g/dL


 


Albumin/Globulin Ratio     














  04/25/18 Range/Units





  06:00 


 


WBC   (4.5-12.0)  X10-3/uL


 


RBC   (3.23-5.20)  x10(6)uL


 


Hgb   (11.5-15.5)  g/dL


 


Hct   (30.0-51.3)  %


 


MCV   (80-96)  fL


 


MCH   (27.7-33.6)  pg


 


MCHC   (32.2-35.4)  g/dL


 


RDW   (11.5-15.5)  %


 


Plt Count   (125-369)  X10(3)uL


 


MPV   (7.4-10.4)  fL


 


Neut % (Auto)   (46-82)  %


 


Lymph % (Auto)   (13-37)  %


 


Mono % (Auto)   (4-12)  %


 


Eos % (Auto)   (1.0-5.0)  %


 


Baso % (Auto)   (0-2)  %


 


Neut # (Auto)   (1.6-8.3)  #


 


Lymph # (Auto)   (0.6-5.0)  #


 


Mono # (Auto)   (0.0-1.3)  #


 


Eos # (Auto)   (0.0-0.8)  #


 


Baso # (Auto)   (0.0-0.2)  #


 


Sodium  153 H  (135-145)  mmol/L


 


Potassium  3.4 L  (3.5-5.3)  mmol/L


 


Chloride  122 H*  (100-110)  mmol/L


 


Carbon Dioxide  21  (21-32)  mmol/L


 


BUN  29 H  (7-18)  mg/dL


 


Creatinine  1.0  (0.55-1.02)  mg/dL


 


Est Cr Clr Drug Dosing  32.77  mL/min


 


Estimated GFR (MDRD)  53 L  (>60)  


 


BUN/Creatinine Ratio  29.0 H  (9-20)  


 


Glucose  82  ()  mg/dL


 


Calcium  8.4 L  (8.6-10.2)  mg/dL


 


Total Bilirubin   (0.1-1.3)  mg/dL


 


AST   (5-25)  IU/L


 


ALT   (12-36)  U/L


 


Alkaline Phosphatase   ()  IU/L


 


Troponin I   (<0.017-0.056)  ng/mL


 


Total Protein   (6.0-8.0)  g/dL


 


Albumin   (3.2-4.6)  g/dL


 


Globulin   g/dL


 


Albumin/Globulin Ratio   











Med Orders - Current: 


 Current Medications





Aspirin (Aspirin)  81 mg PO DAILY [facility]


   Last Admin: 04/24/18 15:06 Dose:  81 mg


Sodium Chloride (Sodium Chloride 0.45%)  1,000 mls @ 100 mls/hr IV ASDIRECTED 

[facility]


Levetiracetam (Keppra)  500 mg PO 10,18 [facility]


   Last Admin: 04/24/18 18:28 Dose:  500 mg


Metoprolol Succinate (Toprol Xl)  12.5 mg PO DAILY [facility]


   Last Admin: 04/24/18 15:07 Dose:  12.5 mg


Sodium Chloride (Saline Flush)  10 ml FLUSH ASDIRECTED PRN


   PRN Reason: Keep Vein Open


   Last Admin: 04/23/18 18:01 Dose:  10 ml





Discontinued Medications





Heparin Sodium (Porcine) (Heparin Sodium)  5,000 units SUBCUT Q12H [facility]


   Last Admin: 04/25/18 01:28 Dose:  5,000 units


Sodium Chloride (Normal Saline)  1,000 mls @ 999 mls/hr IV .BOLUS ONE


   Stop: 04/23/18 18:25


   Last Admin: 04/23/18 18:00 Dose:  999 mls/hr


Sodium Chloride (Sodium Chloride 0.45%)  1,000 mls @ 999 mls/hr IV ASDIRECTED 

[facility]


   Last Admin: 04/23/18 19:04 Dose:  999 mls/hr


Sodium Chloride (Sodium Chloride 0.45%)  1,000 mls @ 125 mls/hr IV ASDIRECTED 

[facility]


   Last Admin: 04/24/18 04:33 Dose:  125 mls/hr


Levofloxacin/Dextrose 750 mg/ (Premix)  150 mls @ 100 mls/hr IV Q48H [facility]


   Last Admin: 04/24/18 09:35 Dose:  100 mls/hr


Sodium Chloride (Normal Saline)  1,000 mls @ 100 mls/hr IV ASDIRECTED [facility]


   Last Admin: 04/25/18 05:53 Dose:  100 mls/hr











- Exam


General: Alert, No Acute Distress


HEENT: Pupils Equal, Pupils Reactive


Neck: Supple


Lungs: Normal Respiratory Effort (good air movement.), Crackles (bases 

bilaterally in bed.)


Cardiovascular: Regular Rate, Regular Rhythm, No Murmurs


GI/Abdominal Exam: Normal Bowel Sounds, Soft, Non-Tender, No Distention


Extremities: No Pedal Edema


Skin: Warm, Dry, Intact


Psy/Mental Status: Alert





- Problem List & Annotations


(1) Hypernatremia


SNOMED Code(s): 35472781


   Code(s): E87.0 - HYPEROSMOLALITY AND HYPERNATREMIA   Status: Acute   Current 

Visit: Yes   Annotation/Comment:: Sodium was 153 last night and held steady 

overnight at 153.  I am going to change her to 1/2 NS this am to increase free 

water and continue to monitor.   





(2) Pneumonia


SNOMED Code(s): 560538362


   Code(s): J18.9 - PNEUMONIA, UNSPECIFIED ORGANISM   Status: Acute   Current 

Visit: Yes   Annotation/Comment:: Completed 5 days of levaquin.   





(3) Elevated troponin


SNOMED Code(s): 833037456, 308661736, 246424433


   Code(s): R74.8 - ABNORMAL LEVELS OF OTHER SERUM ENZYMES   Status: Acute   

Current Visit: Yes   Annotation/Comment:: Presumptive NSTEMI at some point in 

the recent past.  Continue aspirin, beta blocker at low dose.  Patient is still 

somewhat hypotensive.  Continue IVF but monitor closely for signs of heart 

failure.  Not a candidate for ACEI at this time due to low blood pressure.   





(4) Seizure disorder, complex partial


SNOMED Code(s): 512973581


   Code(s): G40.209 - LOCAL-REL SYMPTC EPI W CMPLX PRT SEIZ,NOT NTRCT,W/O STAT 

EPI   Status: Acute   Current Visit: Yes   


Qualifiers: 


   Epilepsy type: partial symptomatic 


Annotation/Comment:: Continue keppra at outpatient dose.   





(5) Acute kidney injury


SNOMED Code(s): 22400092


   Code(s): N17.9 - ACUTE KIDNEY FAILURE, UNSPECIFIED   Status: Acute   Current 

Visit: Yes   Annotation/Comment:: Baseline creatinine 0.94 in 2017.  Now at 

1.0.  Continue IVF as above.   





(6) Moderate mental retardation


SNOMED Code(s): 093454791, 772824772


   Code(s): F71 - MODERATE INTELLECTUAL DISABILITIES   Status: Acute   Current 

Visit: Yes   Annotation/Comment:: Work with staff at group home to learn how to 

meet patient's psychosocial and physical needs.     





(7) DVT prophylaxis


SNOMED Code(s): 378353618, 829822647


   Code(s): CVL7933 -    Status: Acute   Current Visit: Yes   Annotation/Comment

:: Stop heparin due to drop in platelets.  Although this may be mostly 

hemodilutional, now platelets are 106.  If stable overnight will start lovenox 

tomorrow.  SCDs.   





- Problem List Review


Problem List Initiated/Reviewed/Updated: Yes





- My Orders


Last 24 Hours: 


My Active Orders





04/24/18 08:10


EKG 12 Lead [EK] Stat 





04/24/18 10:00


levETIRAcetam [Keppra]   500 mg PO 10,18 





04/24/18 10:13


Activity as Tolerated [RC] 08,12,18 





04/24/18 13:47


Height and Weight [RC] DAILY 


Oxygen Therapy [RC] PRN 


Up ad Caron [RC] ASDIRECTED 


Up to Chair [RC] ASDIRECTED 


VTE/DVT Education [RC] Per Unit Routine 


Vital Signs [RC] 08,12,16,20,00,04 


Resuscitation Status Routine 





04/24/18 13:48


Intake and Output [RC] 06,14,22 





04/24/18 13:49


Pulse Oximetry [RC] PRN 


Sequential Compression Device [OM.PC] Per Unit Routine 





04/24/18 14:45


Aspirin   81 mg PO DAILY 


Metoprolol Succinate [Toprol XL]   12.5 mg PO DAILY 





04/24/18 Breakfast


Mechanical Soft Diet [DIET] 





04/25/18 07:15


Sodium Chloride 0.45% @ 100 MLS/HR(1,000ml) Sodium Chloride 0.45% 1,000 ml IV 

ASDIRECTED 














- Plan


Plan:: 


Code status discussed with brother Leopoldo.  No CPR if patient gets so ill she 

dies - he would want her to be able to pass peacefully.  Possibly intubation 

depending on the situation and if short term and reversible.  Thus DNR/Yes 

Intubation with limits.

## 2018-04-26 NOTE — PCM.PN
- General Info


Date of Service: 04/26/18


Subjective Update: 


Patient is a 77-year-old female currently on hospital day #4 in the ICU for 

severe hypernatremia. Sodium came down from 153 yesterday to 148 this morning. 

Based on her weight and current serum sodium estimated free water deficit is 

about a liter and a half at this time but the patient appears to be volume 

resuscitated. Blood pressures in the 110s today. Patient had a good day 

yesterday talking interacting with staff and singing. I am seeing her just as 

she is woken up this morning and she is doing well. No discomfort. Responsive. 

Verbal but difficult to understand.








- Patient Data


Vitals - Most Recent: 


 Last Vital Signs











Temp  36.9 C   04/26/18 04:00


 


Pulse  88   04/26/18 04:00


 


Resp  20   04/26/18 04:00


 


BP  117/68   04/26/18 04:00


 


Pulse Ox  92 L  04/26/18 04:00











Weight - Most Recent: 51.795 kg


I&O - Last 24 Hours: 


 Intake & Output











 04/25/18 04/26/18 04/26/18





 22:59 06:59 14:59


 


Intake Total 840 30 


 


Output Total 350  


 


Balance 490 30 











Lab Results Last 24 Hours: 


 Laboratory Results - last 24 hr











  04/26/18 04/26/18 Range/Units





  07:20 07:20 


 


WBC  7.5   (4.5-12.0)  X10-3/uL


 


RBC  4.43   (3.23-5.20)  x10(6)uL


 


Hgb  13.3   (11.5-15.5)  g/dL


 


Hct  39.8   (30.0-51.3)  %


 


MCV  89.8   (80-96)  fL


 


MCH  30.0   (27.7-33.6)  pg


 


MCHC  33.4   (32.2-35.4)  g/dL


 


RDW  14.0   (11.5-15.5)  %


 


Plt Count  112 L   (125-369)  X10(3)uL


 


MPV  10.2   (7.4-10.4)  fL


 


Neut % (Auto)  67.6   (46-82)  %


 


Lymph % (Auto)  17.2   (13-37)  %


 


Mono % (Auto)  8.2   (4-12)  %


 


Eos % (Auto)  7 H   (1.0-5.0)  %


 


Baso % (Auto)  1   (0-2)  %


 


Neut # (Auto)  5.1   (1.6-8.3)  #


 


Lymph # (Auto)  1.3   (0.6-5.0)  #


 


Mono # (Auto)  0.6   (0.0-1.3)  #


 


Eos # (Auto)  0.5   (0.0-0.8)  #


 


Baso # (Auto)  0.0   (0.0-0.2)  #


 


Sodium   148 H  (135-145)  mmol/L


 


Potassium   3.4 L  (3.5-5.3)  mmol/L


 


Chloride   116 H* D  (100-110)  mmol/L


 


Carbon Dioxide   22  (21-32)  mmol/L


 


BUN   21 H  (7-18)  mg/dL


 


Creatinine   1.0  (0.55-1.02)  mg/dL


 


Est Cr Clr Drug Dosing   32.77  mL/min


 


Estimated GFR (MDRD)   53 L  (>60)  


 


BUN/Creatinine Ratio   21.0 H  (9-20)  


 


Glucose   93  ()  mg/dL


 


Calcium   8.2 L  (8.6-10.2)  mg/dL











Med Orders - Current: 


 Current Medications





Aspirin (Aspirin)  81 mg PO DAILY [facility]


   Last Admin: 04/25/18 09:37 Dose:  81 mg


Dextrose/Water (Dextrose 5% In Water)  1,000 mls @ 50 mls/hr IV ASDIRECTED [facility]


   Stop: 04/27/18 04:29


Levetiracetam (Keppra)  500 mg PO 10,18 [facility]


   Last Admin: 04/25/18 18:03 Dose:  500 mg


Metoprolol Succinate (Toprol Xl)  12.5 mg PO DAILY [facility]


   Last Admin: 04/25/18 09:37 Dose:  12.5 mg


Sodium Chloride (Saline Flush)  10 ml FLUSH ASDIRECTED PRN


   PRN Reason: Keep Vein Open


   Last Admin: 04/23/18 18:01 Dose:  10 ml





Discontinued Medications





Heparin Sodium (Porcine) (Heparin Sodium)  5,000 units SUBCUT Q12H [facility]


   Last Admin: 04/25/18 01:28 Dose:  5,000 units


Sodium Chloride (Normal Saline)  1,000 mls @ 999 mls/hr IV .BOLUS ONE


   Stop: 04/23/18 18:25


   Last Admin: 04/23/18 18:00 Dose:  999 mls/hr


Sodium Chloride (Sodium Chloride 0.45%)  1,000 mls @ 999 mls/hr IV ASDIRECTED 

[facility]


   Last Admin: 04/23/18 19:04 Dose:  999 mls/hr


Sodium Chloride (Sodium Chloride 0.45%)  1,000 mls @ 125 mls/hr IV ASDIRECTED 

[facility]


   Last Admin: 04/24/18 04:33 Dose:  125 mls/hr


Levofloxacin/Dextrose 750 mg/ (Premix)  150 mls @ 100 mls/hr IV Q48H [facility]


   Last Admin: 04/24/18 09:35 Dose:  100 mls/hr


Sodium Chloride (Normal Saline)  1,000 mls @ 100 mls/hr IV ASDIRECTED [facility]


   Last Admin: 04/25/18 05:53 Dose:  100 mls/hr


Sodium Chloride (Sodium Chloride 0.45%)  1,000 mls @ 100 mls/hr IV ASDIRECTED 

[facility]


   Last Admin: 04/26/18 04:19 Dose:  100 mls/hr











- Exam


General: Alert, Cooperative, No Acute Distress


HEENT: Pupils Equal, Pupils Reactive


Neck: Supple


Lungs: Clear to Auscultation, Normal Respiratory Effort


Cardiovascular: Regular Rate, Regular Rhythm, No Murmurs


GI/Abdominal Exam: Normal Bowel Sounds, Soft, Non-Tender, No Distention


Extremities: No Pedal Edema


Psy/Mental Status: Alert





- Problem List & Annotations


(1) Hypernatremia


SNOMED Code(s): 00088186


   Code(s): E87.0 - HYPEROSMOLALITY AND HYPERNATREMIA   Status: Acute   Current 

Visit: Yes   Annotation/Comment:: Sodium 148. Calculated free water deficit is 

about 1-1/2 L. I am going to start her on D5W at 50 mL/h, recheck sodium in 8 

hours. If normal at that time, will lock IV fluids and transfer out of ICU 

status. After echocardiogram, patient would likely be able to discharge home 

tomorrow. It will be important at the group home for patient to take adequate 

free water particularly given that she is on thickened liquids.   





(2) Pneumonia


SNOMED Code(s): 046017317


   Code(s): J18.9 - PNEUMONIA, UNSPECIFIED ORGANISM   Status: Acute   Current 

Visit: Yes   Annotation/Comment:: Completed 5 days of levaquin.   





(3) Elevated troponin


SNOMED Code(s): 696766453, 790242873, 918943437


   Code(s): R74.8 - ABNORMAL LEVELS OF OTHER SERUM ENZYMES   Status: Acute   

Current Visit: Yes   Annotation/Comment:: Presumptive NSTEMI at some point in 

the recent past.  Continue aspirin, beta blocker at low dose.  Hypotension 

resolved.  Consider addition of ACE inhibitor in the future.   





(4) Seizure disorder, complex partial


SNOMED Code(s): 553707943


   Code(s): G40.209 - LOCAL-REL SYMPTC EPI W CMPLX PRT SEIZ,NOT NTRCT,W/O STAT 

EPI   Status: Acute   Current Visit: Yes   


Qualifiers: 


   Epilepsy type: partial symptomatic 


Annotation/Comment:: Continue keppra at outpatient dose.   





(5) Acute kidney injury


SNOMED Code(s): 97522674


   Code(s): N17.9 - ACUTE KIDNEY FAILURE, UNSPECIFIED   Status: Acute   Current 

Visit: Yes   Annotation/Comment:: Baseline creatinine 0.94 in 2017.  Now at 

1.0.  Continue IVF as above.   





(6) Moderate mental retardation


SNOMED Code(s): 492576598, 343858824


   Code(s): F71 - MODERATE INTELLECTUAL DISABILITIES   Status: Acute   Current 

Visit: Yes   Annotation/Comment:: Work with staff at group home to learn how to 

meet patient's psychosocial and physical needs.     





(7) DVT prophylaxis


SNOMED Code(s): 852283489, 100035988


   Code(s): DJU4430 -    Status: Acute   Current Visit: Yes   Annotation/Comment

:: Start Lovenox today. Platelets are stable at 112.  SCDs.   





- Problem List Review


Problem List Initiated/Reviewed/Updated: Yes





- My Orders


Last 24 Hours: 


My Active Orders





04/26/18 08:30


Dextrose 5% in Water @  50 MLS/HR(1000ml) Dextrose 5% in Water 1,000 ml IV 

ASDIRECTED 





04/26/18 15:00


SODIUM,NA [CHEM] Timed 





04/26/18 Breakfast


Pureed Diet [DIET] 





04/27/18 05:11


BASIC METABOLIC PANEL,BMP [CHEM] AM 


CBC WITH AUTO DIFF [HEME] AM 





04/27/18 08:00


Echo Comp wo Cont [US] Routine 














- Plan


Plan:: 


Code status discussed with brother Leopoldo.  No CPR if patient gets so ill she 

dies - he would want her to be able to pass peacefully.  Possibly intubation 

depending on the situation and if short term and reversible.  Thus DNR/Yes 

Intubation with limits.

## 2018-04-27 RX ADMIN — Medication PRN ML: at 09:30

## 2018-04-27 NOTE — PCM.PN
- General Info


Date of Service: 04/27/18


Subjective Update: 


Ana M did well overnight however she needs oxygen to keep saturations up. She'

s also noted to be puffy and wheezing. No fevers been reported.





- Review of Systems


Genitourinary: Reports: No Symptoms


Musculoskeletal: Reports: No Symptoms





- Patient Data


Vitals - Most Recent: 


 Last Vital Signs











Temp  99.8 F   04/27/18 03:51


 


Pulse  83   04/27/18 09:06


 


Resp  21 H  04/27/18 03:51


 


BP  149/76 H  04/27/18 09:06


 


Pulse Ox  85 L  04/27/18 03:51











Weight - Most Recent: 56.019 kg


I&O - Last 24 Hours: 


 Intake & Output











 04/26/18 04/27/18 04/27/18





 22:59 06:59 14:59


 


Intake Total 393 500 


 


Output Total 822 350 


 


Balance -429 150 











Lab Results Last 24 Hours: 


 Laboratory Results - last 24 hr











  04/26/18 04/27/18 04/27/18 Range/Units





  15:03 06:15 06:15 


 


WBC   8.5   (4.5-12.0)  X10-3/uL


 


RBC   4.22   (3.23-5.20)  x10(6)uL


 


Hgb   12.6   (11.5-15.5)  g/dL


 


Hct   38.0   (30.0-51.3)  %


 


MCV   89.9   (80-96)  fL


 


MCH   29.9   (27.7-33.6)  pg


 


MCHC   33.2   (32.2-35.4)  g/dL


 


RDW   14.2   (11.5-15.5)  %


 


Plt Count   119 L   (125-369)  X10(3)uL


 


MPV   10.7 H   (7.4-10.4)  fL


 


Neut % (Auto)   70.8   (46-82)  %


 


Lymph % (Auto)   14.6   (13-37)  %


 


Mono % (Auto)   9.0   (4-12)  %


 


Eos % (Auto)   5   (1.0-5.0)  %


 


Baso % (Auto)   0   (0-2)  %


 


Neut # (Auto)   6.1   (1.6-8.3)  #


 


Lymph # (Auto)   1.2   (0.6-5.0)  #


 


Mono # (Auto)   0.8   (0.0-1.3)  #


 


Eos # (Auto)   0.4   (0.0-0.8)  #


 


Baso # (Auto)   0.0   (0.0-0.2)  #


 


Sodium  147 H   145  (135-145)  mmol/L


 


Potassium    3.4 L  (3.5-5.3)  mmol/L


 


Chloride    112 H  (100-110)  mmol/L


 


Carbon Dioxide    22  (21-32)  mmol/L


 


BUN    16  (7-18)  mg/dL


 


Creatinine    0.9  (0.55-1.02)  mg/dL


 


Est Cr Clr Drug Dosing    36.41  mL/min


 


Estimated GFR (MDRD)    > 60  (>60)  


 


BUN/Creatinine Ratio    17.8  (9-20)  


 


Glucose    96  ()  mg/dL


 


Calcium    8.2 L  (8.6-10.2)  mg/dL











Med Orders - Current: 


 Current Medications





Aspirin (Aspirin)  81 mg PO DAILY [facility]


   Last Admin: 04/27/18 09:05 Dose:  81 mg


Enoxaparin Sodium (Lovenox)  30 mg SUBCUT Q24H [facility]


   Last Admin: 04/27/18 09:05 Dose:  30 mg


Furosemide (Lasix)  40 mg IVPUSH DAILY [facility]


Levetiracetam (Keppra)  500 mg PO 10,18 [facility]


   Last Admin: 04/27/18 09:08 Dose:  500 mg


Sodium Chloride (Saline Flush)  10 ml FLUSH ASDIRECTED PRN


   PRN Reason: Keep Vein Open


   Last Admin: 04/23/18 18:01 Dose:  10 ml





Discontinued Medications





Heparin Sodium (Porcine) (Heparin Sodium)  5,000 units SUBCUT Q12H [facility]


   Last Admin: 04/25/18 01:28 Dose:  5,000 units


Sodium Chloride (Normal Saline)  1,000 mls @ 999 mls/hr IV .BOLUS ONE


   Stop: 04/23/18 18:25


   Last Admin: 04/23/18 18:00 Dose:  999 mls/hr


Sodium Chloride (Sodium Chloride 0.45%)  1,000 mls @ 999 mls/hr IV ASDIRECTED 

[facility]


   Last Admin: 04/23/18 19:04 Dose:  999 mls/hr


Sodium Chloride (Sodium Chloride 0.45%)  1,000 mls @ 125 mls/hr IV ASDIRECTED 

[facility]


   Last Admin: 04/24/18 04:33 Dose:  125 mls/hr


Levofloxacin/Dextrose 750 mg/ (Premix)  150 mls @ 100 mls/hr IV Q48H [facility]


   Last Admin: 04/24/18 09:35 Dose:  100 mls/hr


Sodium Chloride (Normal Saline)  1,000 mls @ 100 mls/hr IV ASDIRECTED [facility]


   Last Admin: 04/25/18 05:53 Dose:  100 mls/hr


Sodium Chloride (Sodium Chloride 0.45%)  1,000 mls @ 100 mls/hr IV ASDIRECTED 

[facility]


   Last Admin: 04/26/18 04:19 Dose:  100 mls/hr


Dextrose/Water (Dextrose 5% In Water)  1,000 mls @ 50 mls/hr IV ASDIRECTED [facility]


   Stop: 04/28/18 04:29


   Last Admin: 04/27/18 04:15 Dose:  50 mls/hr


Metoprolol Succinate (Toprol Xl)  12.5 mg PO DAILY [facility]


   Last Admin: 04/27/18 09:06 Dose:  12.5 mg











- Exam


Quality Assessment: Supplemental Oxygen


General: Lethargic


HEENT: Pupils Equal


Neck: Supple


Lungs: Crackles, Rhonchi


Cardiovascular: Regular Rate





- Problem List & Annotations


(1) CHF (congestive heart failure)


SNOMED Code(s): 16785335


   Code(s): I50.9 - HEART FAILURE, UNSPECIFIED   Status: Acute   Current Visit: 

Yes   





(2) Elevated troponin


SNOMED Code(s): 827125917, 227112021, 338444997


   Code(s): R74.8 - ABNORMAL LEVELS OF OTHER SERUM ENZYMES   Status: Acute   

Current Visit: Yes   Annotation/Comment:: Presumptive NSTEMI at some point in 

the recent past.  Continue aspirin, beta blocker at low dose.  Hypotension 

resolved.  Consider addition of ACE inhibitor in the future.   





(3) Hypernatremia


SNOMED Code(s): 36535638


   Code(s): E87.0 - HYPEROSMOLALITY AND HYPERNATREMIA   Status: Acute   Current 

Visit: Yes   





(4) Moderate mental retardation


SNOMED Code(s): 567101710, 943410716


   Code(s): F71 - MODERATE INTELLECTUAL DISABILITIES   Status: Acute   Current 

Visit: Yes   Annotation/Comment:: Work with staff at group home to learn how to 

meet patient's psychosocial and physical needs.     





(5) Seizure disorder, complex partial


SNOMED Code(s): 903624413


   Code(s): G40.209 - LOCAL-REL SYMPTC EPI W CMPLX PRT SEIZ,NOT NTRCT,W/O STAT 

EPI   Status: Acute   Current Visit: Yes   


Qualifiers: 


   Epilepsy type: partial symptomatic 


Annotation/Comment:: Continue keppra at outpatient dose.   





- Problem List Review


Problem List Initiated/Reviewed/Updated: Yes





- My Orders


Last 24 Hours: 


My Active Orders





04/27/18 09:15


Patient Status Manage Transfer [TRANSFER] Routine 


Furosemide [Lasix]   40 mg IVPUSH DAILY 





04/28/18 05:11


BASIC METABOLIC PANEL,BMP [CHEM] AM 


PRO B-TYPE NATRIUR PEPT,BNPPRO [CHEM] DAILY 


TROPONIN I [CHEM] AM 














- Plan


Plan:: 


I will transfer the patient out of ICU. I ordered Lasix 40 mg IV and 

discontinued D5 water. She has an echocardiogram later today. I will repeat 

some blood work in the morning and continue oxygen supplementation by nasal 

cannula.

## 2018-04-27 NOTE — CR
INDICATION:  Choking.  



SWALLOWING FUNCTION WITH VIDEO:  3 minutes 46 seconds video fluoroscopy time 
with DVD recording and various barium-tinged meals - fluids - were utilized in 
evaluating the swallowing mechanism.  



On the initial swallow with thin liquids, there was penetration which was a 
significant degree of penetration, but without aspiration.  No aspiration 
occurred with the thin liquids or with the remainder of the honey and nectar 
thicknesses.  



Subsequent thin liquids did not penetrate significantly.  



Examination was compared with the previous examination of 10/06/2015, at which 
time there was a normal swallowing mechanism noted.  



Toward the end of the examination with thin liquids, there was spillage noted 
with significant retention in the valleculae and piriform sinuses until the 
swallowing mechanism could be initiated by placing a spoon in the oral cavity.  
[facility]

## 2018-04-28 RX ADMIN — Medication PRN ML: at 09:49

## 2018-04-28 RX ADMIN — Medication PRN ML: at 09:04

## 2018-04-28 RX ADMIN — Medication PRN ML: at 13:40

## 2018-04-28 NOTE — PCM.PN
- General Info


Date of Service: 04/28/18


Admission Dx/Problem (Free Text): 


 Admission Diagnosis/Problem





Admission Diagnosis/Problem      Hypernatremia








Subjective Update: 


Patient is down on oxygen needed up to 2 L now. Should swallow eval yesterday 

that showed spillage of thin liquids. She also had an echocardiogram that 

showed severe pulmonary hypertension and right ventricular dysfunction-

systolic. No fevers been reported. She still puffy, and it's difficult to 

obtain her weight. She is also incontinent of urine.





- Review of Systems


Gastrointestinal: Reports: No Symptoms


Genitourinary: Reports: No Symptoms





- Patient Data


Vitals - Most Recent: 


 Last Vital Signs











Temp  98.1 F   04/28/18 07:35


 


Pulse  78   04/28/18 07:35


 


Resp  20   04/28/18 07:35


 


BP  98/53 L  04/28/18 07:35


 


Pulse Ox  93 L  04/28/18 07:35











Weight - Most Recent: 51.573 kg


I&O - Last 24 Hours: 


 Intake & Output











 04/27/18 04/28/18 04/28/18





 22:59 06:59 14:59


 


Intake Total 220 120 


 


Balance 220 120 











Lab Results Last 24 Hours: 


 Laboratory Results - last 24 hr











  04/28/18 04/28/18 Range/Units





  06:00 06:00 


 


Sodium  146 H   (135-145)  mmol/L


 


Potassium  4.1   (3.5-5.3)  mmol/L


 


Chloride  111 H   (100-110)  mmol/L


 


Carbon Dioxide  26   (21-32)  mmol/L


 


BUN  16   (7-18)  mg/dL


 


Creatinine  0.9   (0.55-1.02)  mg/dL


 


Est Cr Clr Drug Dosing  36.41   mL/min


 


Estimated GFR (MDRD)  > 60   (>60)  


 


BUN/Creatinine Ratio  17.8   (9-20)  


 


Glucose  90   ()  mg/dL


 


Calcium  8.4 L   (8.6-10.2)  mg/dL


 


Troponin I   0.037  (<0.017-0.056)  ng/mL


 


NT-Pro-B Natriuret Pep   04817 H*  (<=450)  pg/mL











Med Orders - Current: 


 Current Medications





Aspirin (Aspirin)  81 mg PO DAILY [facility]


   Last Admin: 04/27/18 09:05 Dose:  81 mg


Enoxaparin Sodium (Lovenox)  30 mg SUBCUT Q24H [facility]


   Last Admin: 04/27/18 09:05 Dose:  30 mg


Furosemide (Lasix)  40 mg IVPUSH BID [facility]


Levetiracetam (Keppra)  500 mg PO 10,18 [facility]


   Last Admin: 04/27/18 18:19 Dose:  500 mg


Sodium Chloride (Saline Flush)  10 ml FLUSH ASDIRECTED PRN


   PRN Reason: Keep Vein Open


   Last Admin: 04/27/18 09:30 Dose:  10 ml





Discontinued Medications





Furosemide (Lasix)  40 mg IVPUSH DAILY [facility]


   Last Admin: 04/27/18 09:30 Dose:  40 mg


Heparin Sodium (Porcine) (Heparin Sodium)  5,000 units SUBCUT Q12H [facility]


   Last Admin: 04/25/18 01:28 Dose:  5,000 units


Sodium Chloride (Normal Saline)  1,000 mls @ 999 mls/hr IV .BOLUS ONE


   Stop: 04/23/18 18:25


   Last Admin: 04/23/18 18:00 Dose:  999 mls/hr


Sodium Chloride (Sodium Chloride 0.45%)  1,000 mls @ 999 mls/hr IV ASDIRECTED 

[facility]


   Last Admin: 04/23/18 19:04 Dose:  999 mls/hr


Sodium Chloride (Sodium Chloride 0.45%)  1,000 mls @ 125 mls/hr IV ASDIRECTED 

[facility]


   Last Admin: 04/24/18 04:33 Dose:  125 mls/hr


Levofloxacin/Dextrose 750 mg/ (Premix)  150 mls @ 100 mls/hr IV Q48H [facility]


   Last Admin: 04/24/18 09:35 Dose:  100 mls/hr


Sodium Chloride (Normal Saline)  1,000 mls @ 100 mls/hr IV ASDIRECTED [facility]


   Last Admin: 04/25/18 05:53 Dose:  100 mls/hr


Sodium Chloride (Sodium Chloride 0.45%)  1,000 mls @ 100 mls/hr IV ASDIRECTED 

[facility]


   Last Admin: 04/26/18 04:19 Dose:  100 mls/hr


Dextrose/Water (Dextrose 5% In Water)  1,000 mls @ 50 mls/hr IV ASDIRECTED [facility]


   Stop: 04/28/18 04:29


   Last Admin: 04/27/18 04:15 Dose:  50 mls/hr


Metoprolol Succinate (Toprol Xl)  12.5 mg PO DAILY [facility]


   Last Admin: 04/27/18 09:06 Dose:  12.5 mg











- Exam


Quality Assessment: Supplemental Oxygen


General: Alert


HEENT: Pupils Equal


Lungs: Wheezing


Cardiovascular: Regular Rate, Murmurs





- Problem List & Annotations


(1) CHF (congestive heart failure)


SNOMED Code(s): 34945273


   Code(s): I50.9 - HEART FAILURE, UNSPECIFIED   Status: Acute   Current Visit: 

Yes   


Qualifiers: 


   Heart failure type: right-sided 





(2) Elevated troponin


SNOMED Code(s): 611858506, 742675569, 199387141


   Code(s): R74.8 - ABNORMAL LEVELS OF OTHER SERUM ENZYMES   Status: Acute   

Current Visit: Yes   Annotation/Comment:: Presumptive NSTEMI at some point in 

the recent past.  Continue aspirin, beta blocker at low dose.  Hypotension 

resolved.  Consider addition of ACE inhibitor in the future.   





(3) Hypernatremia


SNOMED Code(s): 19437181


   Code(s): E87.0 - HYPEROSMOLALITY AND HYPERNATREMIA   Status: Acute   Current 

Visit: Yes   





(4) Moderate mental retardation


SNOMED Code(s): 949766448, 729991113


   Code(s): F71 - MODERATE INTELLECTUAL DISABILITIES   Status: Acute   Current 

Visit: Yes   Annotation/Comment:: Work with staff at group home to learn how to 

meet patient's psychosocial and physical needs.     





(5) Seizure disorder, complex partial


SNOMED Code(s): 706301516


   Code(s): G40.209 - LOCAL-REL SYMPTC EPI W CMPLX PRT SEIZ,NOT NTRCT,W/O STAT 

EPI   Status: Acute   Current Visit: Yes   


Qualifiers: 


   Epilepsy type: partial symptomatic 


Annotation/Comment:: Continue keppra at outpatient dose.   





(6) Pulmonary hypertension


SNOMED Code(s): 65614269


   Code(s): I27.20 - PULMONARY HYPERTENSION, UNSPECIFIED   Status: Acute   

Current Visit: Yes   





- Problem List Review


Problem List Initiated/Reviewed/Updated: Yes





- My Orders


Last 24 Hours: 


My Active Orders





04/28/18 09:00


Furosemide [Lasix]   40 mg IVPUSH BID 





04/29/18 05:11


BASIC METABOLIC PANEL,BMP [CHEM] AM 


CBC WITH AUTO DIFF [HEME] AM 





04/29/18 06:00


PRO B-TYPE NATRIUR PEPT,BNPPRO [CHEM] DAILY 














- Plan


Plan:: 


I appreciate the speech evaluation, and recommend the honey thickened pured 

diet is recommended. I've increased Lasix to 40 g twice a day strict input and 

output and daily weight is recommended. I will repeat basic profile in the 

morning and BNP, because today the BNP had gone up to 12,000. Echocardiogram 

did show some right-sided systolic dysfunction of the right ventricle and 

severe pulmonary hypertension.

## 2018-04-29 RX ADMIN — Medication PRN ML: at 09:25

## 2018-04-29 RX ADMIN — Medication PRN ML: at 14:43

## 2018-04-29 NOTE — PCM.PN
- General Info


Date of Service: 04/29/18


Subjective Update: 


Patient still needs oxygen. The POA and the nursing staff is also concerned 

about of being too sleepy. His been no report of any fever. Her blood pressure 

has been slightly lower than usual.





- Review of Systems


General: Reports: No Symptoms


Pulmonary: Reports: Cough


Cardiovascular: Reports: Edema


Gastrointestinal: Reports: No Symptoms


Genitourinary: Reports: No Symptoms





- Patient Data


Vitals - Most Recent: 


 Last Vital Signs











Temp  98.1 F   04/29/18 07:40


 


Pulse  117 H  04/29/18 07:40


 


Resp  12   04/29/18 07:40


 


BP  108/62   04/29/18 07:40


 


Pulse Ox  89 L  04/29/18 07:40











Weight - Most Recent: 48.398 kg


I&O - Last 24 Hours: 


 Intake & Output











 04/28/18 04/29/18 04/29/18





 22:59 06:59 14:59


 


Intake Total  240 50


 


Output Total 750 300 


 


Balance -750 -60 50











Lab Results Last 24 Hours: 


 Laboratory Results - last 24 hr











  04/29/18 04/29/18 04/29/18 Range/Units





  06:10 06:10 06:10 


 


WBC  12.6 H    (4.5-12.0)  X10-3/uL


 


RBC  4.59    (3.23-5.20)  x10(6)uL


 


Hgb  13.7    (11.5-15.5)  g/dL


 


Hct  40.7    (30.0-51.3)  %


 


MCV  88.8    (80-96)  fL


 


MCH  29.7    (27.7-33.6)  pg


 


MCHC  33.5    (32.2-35.4)  g/dL


 


RDW  14.0    (11.5-15.5)  %


 


Plt Count  142    (125-369)  X10(3)uL


 


MPV  9.8    (7.4-10.4)  fL


 


Neut % (Auto)  74.6    (46-82)  %


 


Lymph % (Auto)  13.3    (13-37)  %


 


Mono % (Auto)  10.5    (4-12)  %


 


Eos % (Auto)  1    (1.0-5.0)  %


 


Baso % (Auto)  0    (0-2)  %


 


Neut # (Auto)  9.3 H    (1.6-8.3)  #


 


Lymph # (Auto)  1.7    (0.6-5.0)  #


 


Mono # (Auto)  1.3    (0.0-1.3)  #


 


Eos # (Auto)  0.2    (0.0-0.8)  #


 


Baso # (Auto)  0.1    (0.0-0.2)  #


 


Sodium   145   (135-145)  mmol/L


 


Potassium   3.7   (3.5-5.3)  mmol/L


 


Chloride   107   (100-110)  mmol/L


 


Carbon Dioxide   28   (21-32)  mmol/L


 


BUN   18   (7-18)  mg/dL


 


Creatinine   0.9   (0.55-1.02)  mg/dL


 


Est Cr Clr Drug Dosing   36.41   mL/min


 


Estimated GFR (MDRD)   > 60   (>60)  


 


BUN/Creatinine Ratio   20.0   (9-20)  


 


Glucose   124 H   ()  mg/dL


 


Calcium   8.9   (8.6-10.2)  mg/dL


 


NT-Pro-B Natriuret Pep    9701 H*  (<=450)  pg/mL











Med Orders - Current: 


 Current Medications





Aspirin (Aspirin)  81 mg PO DAILY [facility]


   Last Admin: 04/28/18 08:48 Dose:  81 mg


Enoxaparin Sodium (Lovenox)  30 mg SUBCUT Q24H [facility]


   Last Admin: 04/28/18 08:58 Dose:  30 mg


Furosemide (Lasix)  40 mg IVPUSH BIDDIURETIC [facility]


   Last Admin: 04/28/18 13:41 Dose:  40 mg


Levetiracetam (Keppra)  500 mg PO 10,18 [facility]


   Last Admin: 04/28/18 18:24 Dose:  500 mg


Sodium Chloride (Saline Flush)  10 ml FLUSH ASDIRECTED PRN


   PRN Reason: Keep Vein Open


   Last Admin: 04/28/18 13:40 Dose:  10 ml





Discontinued Medications





Furosemide (Lasix)  40 mg IVPUSH DAILY [facility]


   Last Admin: 04/27/18 09:30 Dose:  40 mg


Heparin Sodium (Porcine) (Heparin Sodium)  5,000 units SUBCUT Q12H [facility]


   Last Admin: 04/25/18 01:28 Dose:  5,000 units


Sodium Chloride (Normal Saline)  1,000 mls @ 999 mls/hr IV .BOLUS ONE


   Stop: 04/23/18 18:25


   Last Admin: 04/23/18 18:00 Dose:  999 mls/hr


Sodium Chloride (Sodium Chloride 0.45%)  1,000 mls @ 999 mls/hr IV ASDIRECTED 

[facility]


   Last Admin: 04/23/18 19:04 Dose:  999 mls/hr


Sodium Chloride (Sodium Chloride 0.45%)  1,000 mls @ 125 mls/hr IV ASDIRECTED 

[facility]


   Last Admin: 04/24/18 04:33 Dose:  125 mls/hr


Levofloxacin/Dextrose 750 mg/ (Premix)  150 mls @ 100 mls/hr IV Q48H [facility]


   Last Admin: 04/24/18 09:35 Dose:  100 mls/hr


Sodium Chloride (Normal Saline)  1,000 mls @ 100 mls/hr IV ASDIRECTED [facility]


   Last Admin: 04/25/18 05:53 Dose:  100 mls/hr


Sodium Chloride (Sodium Chloride 0.45%)  1,000 mls @ 100 mls/hr IV ASDIRECTED 

[facility]


   Last Admin: 04/26/18 04:19 Dose:  100 mls/hr


Dextrose/Water (Dextrose 5% In Water)  1,000 mls @ 50 mls/hr IV ASDIRECTED [facility]


   Stop: 04/28/18 04:29


   Last Admin: 04/27/18 04:15 Dose:  50 mls/hr


Metoprolol Succinate (Toprol Xl)  12.5 mg PO DAILY [facility]


   Last Admin: 04/27/18 09:06 Dose:  12.5 mg











- Exam


Quality Assessment: Supplemental Oxygen


General: Lethargic


HEENT: Pupils Equal


Neck: Supple


Lungs: Crackles


Cardiovascular: Murmurs





- Problem List & Annotations


(1) CHF (congestive heart failure)


SNOMED Code(s): 58735530


   Code(s): I50.9 - HEART FAILURE, UNSPECIFIED   Status: Acute   Current Visit: 

Yes   


Qualifiers: 


   Heart failure type: right-sided 





(2) Elevated troponin


SNOMED Code(s): 121166611, 778223042, 079015153


   Code(s): R74.8 - ABNORMAL LEVELS OF OTHER SERUM ENZYMES   Status: Acute   

Current Visit: Yes   





(3) Hypernatremia


SNOMED Code(s): 77517105


   Code(s): E87.0 - HYPEROSMOLALITY AND HYPERNATREMIA   Status: Acute   Current 

Visit: Yes   





(4) Moderate mental retardation


SNOMED Code(s): 126417161, 498273643


   Code(s): F71 - MODERATE INTELLECTUAL DISABILITIES   Status: Acute   Current 

Visit: Yes   Annotation/Comment:: Work with staff at group home to learn how to 

meet patient's psychosocial and physical needs.     





(5) Seizure disorder, complex partial


SNOMED Code(s): 530428685


   Code(s): G40.209 - LOCAL-REL SYMPTC EPI W CMPLX PRT SEIZ,NOT NTRCT,W/O STAT 

EPI   Status: Acute   Current Visit: Yes   


Qualifiers: 


   Epilepsy type: partial symptomatic 


Annotation/Comment:: Continue keppra at outpatient dose.   





(6) Pulmonary hypertension


SNOMED Code(s): 46062620


   Code(s): I27.20 - PULMONARY HYPERTENSION, UNSPECIFIED   Status: Acute   

Current Visit: Yes   





(7) Cough


SNOMED Code(s): 81436885


   Code(s): R05 - COUGH   Status: Acute   Current Visit: Yes   





- Problem List Review


Problem List Initiated/Reviewed/Updated: Yes





- My Orders


Last 24 Hours: 


My Active Orders





04/28/18 09:00


Furosemide [Lasix]   40 mg IVPUSH BIDDIURETIC 





04/29/18 09:07


CXR [Chest 2V] [CR] Routine 





04/30/18 05:11


BASIC METABOLIC PANEL,BMP [CHEM] AM 


CBC WITH AUTO DIFF [HEME] AM 


PRO B-TYPE NATRIUR PEPT,BNPPRO [CHEM] DAILY 


TROPONIN I [CHEM] AM 














- Plan


Plan:: 


Due to the fact of a cough, and continued need of oxygenation, I will obtain a 

chest x-ray today. I will reduce the Lasix dose to 20 mg IV BID due to low BP.A 

Keppra level is ordered due to the lethargy.

## 2018-04-30 RX ADMIN — Medication PRN ML: at 08:27

## 2018-04-30 NOTE — PCM.PN
- General Info


Date of Service: 04/30/18


Subjective Update: 


Patient still needs oxygen. Her blood pressure has been slightly lower than 

usual.No report of fever.Still choking on pureed food.





- Review of Systems


HEENT: Reports: No Symptoms


Pulmonary: Reports: Cough


Genitourinary: Reports: No Symptoms





- Patient Data


Vitals - Most Recent: 


 Last Vital Signs











Temp  99.2 F   04/29/18 19:40


 


Pulse  108 H  04/29/18 19:40


 


Resp  21 H  04/29/18 19:40


 


BP  121/74   04/29/18 19:40


 


Pulse Ox  92 L  04/29/18 19:40











Weight - Most Recent: 48.398 kg


I&O - Last 24 Hours: 


 Intake & Output











 04/29/18 04/30/18 04/30/18





 22:59 06:59 14:59


 


Intake Total 120  


 


Output Total 0  


 


Balance 120  











Lab Results Last 24 Hours: 


 Laboratory Results - last 24 hr











  04/30/18 04/30/18 04/30/18 Range/Units





  06:21 06:21 06:21 


 


WBC  11.9    (4.5-12.0)  X10-3/uL


 


RBC  4.42    (3.23-5.20)  x10(6)uL


 


Hgb  13.4    (11.5-15.5)  g/dL


 


Hct  39.4    (30.0-51.3)  %


 


MCV  89.1    (80-96)  fL


 


MCH  30.3    (27.7-33.6)  pg


 


MCHC  34.0    (32.2-35.4)  g/dL


 


RDW  14.4    (11.5-15.5)  %


 


Plt Count  184    (125-369)  X10(3)uL


 


MPV  10.1    (7.4-10.4)  fL


 


Add Manual Diff  Yes    


 


Neutrophils % (Manual)  75    (46-82)  %


 


Band Neutrophils %  1    (0-6)  %


 


Lymphocytes % (Manual)  19    (13-37)  %


 


Monocytes % (Manual)  5    (4-12)  %


 


Sodium   145   (135-145)  mmol/L


 


Potassium   3.8   (3.5-5.3)  mmol/L


 


Chloride   105   (100-110)  mmol/L


 


Carbon Dioxide   31   (21-32)  mmol/L


 


BUN   29 H D   (7-18)  mg/dL


 


Creatinine   1.2 H   (0.55-1.02)  mg/dL


 


Est Cr Clr Drug Dosing   27.30   mL/min


 


Estimated GFR (MDRD)   43 L   (>60)  


 


BUN/Creatinine Ratio   24.2 H   (9-20)  


 


Glucose   129 H   ()  mg/dL


 


Calcium   9.0   (8.6-10.2)  mg/dL


 


Troponin I    0.018  (<0.017-0.056)  ng/mL


 


NT-Pro-B Natriuret Pep    7416 H*  (<=450)  pg/mL











Med Orders - Current: 


 Current Medications





Aspirin (Aspirin)  81 mg PO DAILY [facility]


   Last Admin: 04/30/18 09:05 Dose:  81 mg


Enoxaparin Sodium (Lovenox)  30 mg SUBCUT Q24H [facility]


   Last Admin: 04/30/18 08:29 Dose:  30 mg


Furosemide (Lasix)  20 mg IVPUSH BIDDIURETIC [facility]


   Last Admin: 04/30/18 08:23 Dose:  20 mg


Levetiracetam (Keppra)  500 mg PO 10,18 [facility]


   Last Admin: 04/30/18 09:05 Dose:  500 mg


Sodium Chloride (Saline Flush)  10 ml FLUSH ASDIRECTED PRN


   PRN Reason: Keep Vein Open


   Last Admin: 04/30/18 08:27 Dose:  10 ml





Discontinued Medications





Furosemide (Lasix)  40 mg IVPUSH DAILY [facility]


   Last Admin: 04/27/18 09:30 Dose:  40 mg


Furosemide (Lasix)  40 mg IVPUSH BIDDIURETIC [facility]


   Last Admin: 04/29/18 09:25 Dose:  40 mg


Furosemide (Lasix)  20 mg IVPUSH BIDDIURETIC [facility]


Heparin Sodium (Porcine) (Heparin Sodium)  5,000 units SUBCUT Q12H [facility]


   Last Admin: 04/25/18 01:28 Dose:  5,000 units


Sodium Chloride (Normal Saline)  1,000 mls @ 999 mls/hr IV .BOLUS ONE


   Stop: 04/23/18 18:25


   Last Admin: 04/23/18 18:00 Dose:  999 mls/hr


Sodium Chloride (Sodium Chloride 0.45%)  1,000 mls @ 999 mls/hr IV ASDIRECTED 

[facility]


   Last Admin: 04/23/18 19:04 Dose:  999 mls/hr


Sodium Chloride (Sodium Chloride 0.45%)  1,000 mls @ 125 mls/hr IV ASDIRECTED 

[facility]


   Last Admin: 04/24/18 04:33 Dose:  125 mls/hr


Levofloxacin/Dextrose 750 mg/ (Premix)  150 mls @ 100 mls/hr IV Q48H [facility]


   Last Admin: 04/24/18 09:35 Dose:  100 mls/hr


Sodium Chloride (Normal Saline)  1,000 mls @ 100 mls/hr IV ASDIRECTED [facility]


   Last Admin: 04/25/18 05:53 Dose:  100 mls/hr


Sodium Chloride (Sodium Chloride 0.45%)  1,000 mls @ 100 mls/hr IV ASDIRECTED 

[facility]


   Last Admin: 04/26/18 04:19 Dose:  100 mls/hr


Dextrose/Water (Dextrose 5% In Water)  1,000 mls @ 50 mls/hr IV ASDIRECTED [facility]


   Stop: 04/28/18 04:29


   Last Admin: 04/27/18 04:15 Dose:  50 mls/hr


Metoprolol Succinate (Toprol Xl)  12.5 mg PO DAILY [facility]


   Last Admin: 04/27/18 09:06 Dose:  12.5 mg











- Exam


Quality Assessment: Supplemental Oxygen


General: No Acute Distress


HEENT: Pupils Equal


Lungs: Decreased Breath Sounds, Rales


Cardiovascular: Murmurs


Extremities: No: Pedal Edema


Skin: Warm


Neurological: No New Focal Deficit





- Problem List & Annotations


(1) CHF (congestive heart failure)


SNOMED Code(s): 10661686


   Code(s): I50.9 - HEART FAILURE, UNSPECIFIED   Status: Acute   Current Visit: 

Yes   


Qualifiers: 


   Heart failure type: right-sided 





(2) Elevated troponin


SNOMED Code(s): 592779588, 151791937, 187463471


   Code(s): R74.8 - ABNORMAL LEVELS OF OTHER SERUM ENZYMES   Status: Acute   

Current Visit: Yes   





(3) Hypernatremia


SNOMED Code(s): 45067503


   Code(s): E87.0 - HYPEROSMOLALITY AND HYPERNATREMIA   Status: Acute   Current 

Visit: Yes   





(4) Moderate mental retardation


SNOMED Code(s): 787365359, 876181721


   Code(s): F71 - MODERATE INTELLECTUAL DISABILITIES   Status: Acute   Current 

Visit: Yes   Annotation/Comment:: Work with staff at group home to learn how to 

meet patient's psychosocial and physical needs.     





(5) Seizure disorder, complex partial


SNOMED Code(s): 848420029


   Code(s): G40.209 - LOCAL-REL SYMPTC EPI W CMPLX PRT SEIZ,NOT NTRCT,W/O STAT 

EPI   Status: Acute   Current Visit: Yes   


Qualifiers: 


   Epilepsy type: partial symptomatic 


Annotation/Comment:: Continue keppra at outpatient dose.   





(6) Pulmonary hypertension


SNOMED Code(s): 11271738


   Code(s): I27.20 - PULMONARY HYPERTENSION, UNSPECIFIED   Status: Acute   

Current Visit: Yes   





(7) Cough


SNOMED Code(s): 45441968


   Code(s): R05 - COUGH   Status: Acute   Current Visit: Yes   





(8) Dysphagia


SNOMED Code(s): 19132002, 792710937


   Code(s): R13.10 - DYSPHAGIA, UNSPECIFIED   Status: Acute   Current Visit: 

Yes   





- Problem List Review


Problem List Initiated/Reviewed/Updated: Yes





- My Orders


Last 24 Hours: 


My Active Orders





04/29/18 09:07


Chest 1V Frontal [CR] Routine 





04/29/18 14:00


Furosemide [Lasix]   20 mg IVPUSH BIDDIURETIC 














- Plan


Plan:: 


Chest x-ray showed pulmonary edema. She is diuresed well, that she is ready to 

go home with home oxygenation. I will discharge her today back to the group 

home.

## 2018-05-01 NOTE — DISCH
DISCHARGE DATE:  04/30/2018

 

REASONS FOR ADMISSION:

1. Hypernatremia secondary to dehydration.

2. Elevated troponin.

3. Moderate mental retardation.

4. Acute kidney injury.

5. History of seizure disorder.

6. Pneumonia.

 

DISCHARGE DIAGNOSES:

1. Hypernatremia, resolved.

2. Pulmonary edema.

3. Pulmonary hypertension.

4. Seizure disorder, stable.

5. Acute kidney injury, stable, resolved.

6. Moderate mental retardation, stable.

7. Dysphagia.

 

CONSULTATIONS:

1. Speech evaluation for video swallow study.

2. Echocardiogram on 04/20/2018.

 

BRIEF HISTORY AND HOSPITAL COURSE:  This is a 77-year-old mentally handicapped

female, who has a history of partial epilepsy.  She presented to the ER with

lethargy, and found to have a white cell count of 17,000, low blood pressure,

and a sodium of 166.  She was admitted for dehydration and hypernatremia, and

was given D5 water for hydration.  Over the next couple of days, sodium

improved.  However, she went into overload the day after I took over.  This

fluid was discontinued and Lasix was given.  An echocardiogram revealed severe

right ventricular dysfunction and pulmonary hypertension.  A repeat chest x-ray

that was done on 04/29/2018 revealed mild pulmonary edema.  Lasix was given

initially at 40 mg IV b.i.d., and later before discharge, 20 mg IV b.i.d.  She

will be discharged on 20 mg orally b.i.d.  Her sodium improved and her

creatinine leveled off at 1.1.  Speech evaluation revealed that she was unable

to swallow well and there was spillage, and the Speech therapist recommended

nectar pureed diet.  Antibiotics were stopped two days after discharge because

no evidence of pneumonia was found.

 

DISCHARGE MEDICATIONS:  She will be discharged on

1. Lasix 20 mg b.i.d.

2. Keppra.

3. Aspirin.

 

DISCHARGE FOLLOWUP:  Followup will be arranged within a week of discharge.

 

I spent more than 35 minutes in the discharge of the patient.

 

Job#: [id number]

DD: 04/30/2018 1009

DT: 05/01/2018 0551 TN/EAR

## 2018-05-07 NOTE — PCM.SN
- Free Text/Narrative


Note: 


I received a call from the patient's brother, Leopoldo, regarding our previous 

conversation about code status and end-of-life decision making.  He has felt 

pressed by the group home to avoid re-hospitalization and consult hospice for 

end of life cares.  He isn't sure this is what he wants to do.  Discussed 

hospice, keeping patients in their home environment, whether or not the patient 

may not be safe to eat due to aspiration, considering if she would need to move 

to a higher level of care without hospice, and discussed the previous CODE 

STATUS discussion we had which reflected a DNR/COLE code status.  Questions 

answered to his satisfaction.  He will follow up with the group home staff.  





EULALIA Bowden MD


5/7/18


[id number]

## 2018-05-15 ENCOUNTER — HOSPITAL ENCOUNTER (INPATIENT)
Dept: HOSPITAL 7 - FB.MS | Age: 78
LOS: 6 days | Discharge: NURSING, REHAB OR LONG-TERM CARE | DRG: 640 | End: 2018-05-21
Attending: FAMILY MEDICINE | Admitting: FAMILY MEDICINE
Payer: MEDICARE

## 2018-05-15 DIAGNOSIS — G40.909: ICD-10-CM

## 2018-05-15 DIAGNOSIS — I27.20: ICD-10-CM

## 2018-05-15 DIAGNOSIS — Z86.69: ICD-10-CM

## 2018-05-15 DIAGNOSIS — F73: ICD-10-CM

## 2018-05-15 DIAGNOSIS — Z51.5: ICD-10-CM

## 2018-05-15 DIAGNOSIS — N17.9: ICD-10-CM

## 2018-05-15 DIAGNOSIS — J18.9: ICD-10-CM

## 2018-05-15 DIAGNOSIS — I50.30: ICD-10-CM

## 2018-05-15 DIAGNOSIS — M19.90: ICD-10-CM

## 2018-05-15 DIAGNOSIS — E87.0: Primary | ICD-10-CM

## 2018-05-15 DIAGNOSIS — E87.8: ICD-10-CM

## 2018-05-15 DIAGNOSIS — E86.0: ICD-10-CM

## 2018-05-15 DIAGNOSIS — Z66: ICD-10-CM

## 2018-05-15 RX ADMIN — HEPARIN SODIUM SCH UNITS: 5000 INJECTION, SOLUTION INTRAVENOUS; SUBCUTANEOUS at 21:02

## 2018-05-15 NOTE — PCM.PN
- General Info


Date of Service: 05/15/18


Admission Dx/Problem (Free Text): 


Critical care note-information from pharmacy says she was on 20 mg of Lasix 

twice a day for 2 weeks that ended today because of last hospitalization. 

Patient is obtunded and responds to pain only.








- Patient Data


Vitals - Most Recent: 


 Last Vital Signs











Temp  96.7 F   05/15/18 16:00


 


Pulse  110 H  05/15/18 12:20


 


Resp  20   05/15/18 16:00


 


BP  112/68   05/15/18 16:00


 


Pulse Ox  100   05/15/18 16:00











Weight - Most Recent: 91 lb


I&O - Last 24 Hours: 


 Intake & Output











 05/15/18 05/15/18 05/15/18





 06:59 14:59 22:59


 


Intake Total  1249 


 


Output Total  75 


 


Balance  1174 











Lab Results Last 24 Hours: 


 Laboratory Results - last 24 hr











  05/15/18 05/15/18 05/15/18 Range/Units





  12:30 12:55 16:10 


 


Sodium    176 H* D  (135-145)  mmol/L


 


Potassium    4.0  (3.5-5.3)  mmol/L


 


Chloride    133 H* D  (100-110)  mmol/L


 


Carbon Dioxide    27  (21-32)  mmol/L


 


BUN    129 H*  (7-18)  mg/dL


 


Creatinine    3.1 H*  (0.55-1.02)  mg/dL


 


Est Cr Clr Drug Dosing    TNP  


 


Estimated GFR (MDRD)    14 L  (>60)  


 


BUN/Creatinine Ratio    41.6 H  (9-20)  


 


Glucose    99  ()  mg/dL


 


Lactic Acid  5.3 H*    (0.4-2.2)  mmol/L


 


Calcium    9.1  (8.6-10.2)  mg/dL


 


Total Bilirubin    0.7  (0.1-1.3)  mg/dL


 


AST    39 H D  (5-25)  IU/L


 


ALT    41 H D  (12-36)  U/L


 


Alkaline Phosphatase    88  ()  IU/L


 


Total Protein    6.2  (6.0-8.0)  g/dL


 


Albumin    2.5 L  (3.2-4.6)  g/dL


 


Globulin    3.7  g/dL


 


Albumin/Globulin Ratio    0.7  


 


Urine Color   Yellow   (YELLOW)  


 


Urine Appearance   Slightly cloudy   (CLEAR)  


 


Urine pH   5.0   (5.0-6.5)  


 


Ur Specific Gravity   1.025   (1.010-1.025)  


 


Urine Protein   Negative   (NEGATIVE)  mg/dL


 


Urine Glucose (UA)   Normal   (NEGATIVE)  mg/dL


 


Urine Ketones   Negative   (NEGATIVE)  mg/dL


 


Urine Occult Blood   Negative   (NEGATIVE)  


 


Urine Nitrite   Negative   (NEGATIVE)  


 


Urine Bilirubin   Negative   (NEGATIVE)  


 


Urine Urobilinogen   Normal   (NEGATIVE)  mg/dL


 


Ur Leukocyte Esterase   Moderate H   (NEGATIVE)  


 


Urine RBC   0-5   (0)  


 


Urine WBC   0-5   (0)  


 


Ur Squamous Epith Cells   Few H   (NS,R,O)  


 


Amorphous Sediment   Moderate   


 


Urine Bacteria   Rare H   (NS)  


 


Hyaline Casts   Many H   (NS)  











[facility] Results Last 24 Hours: 


 Microbiology











 05/15/18 12:35 Anaerobic Blood Culture - Final





 Blood - Venous - Lab Draw 











Med Orders - Current: 


 Current Medications





Heparin Sodium (Porcine) (Heparin Sodium)  5,000 units SUBCUT Q12H [facility]


Azithromycin 500 mg/ Sodium (Chloride)  250 mls @ 250 mls/hr IV Q24H [facility]


   Last Admin: 05/15/18 13:34 Dose:  250 mls/hr


Sodium Chloride (Normal Saline)  1,000 mls @ 999 mls/hr IV ASDIRECTED [facility]AZ


   Last Admin: 05/15/18 13:38 Dose:  999 mls/hr


Sodium Chloride (Normal Saline)  1,000 mls @ 250 mls/hr IV ASDIRECTED [facility]


   Last Admin: 05/15/18 15:45 Dose:  250 mls/hr


Sodium Chloride (Saline Flush)  10 ml FLUSH ASDIRECTED PRN


   PRN Reason: Keep Vein Open





Discontinued Medications





Ceftriaxone Sodium (Rocephin)  1,000 mg IV ONETIME ONE


   Stop: 05/15/18 12:01


   Last Admin: 05/15/18 13:24 Dose:  1,000 mg


Enoxaparin Sodium (Lovenox)  30 mg SUBCUT Q24H [facility]


Ceftriaxone Sodium 1,000 mg/ (Sodium Chloride)  50 mls @ 100 mls/hr IV ONETIME 

ONE


   Stop: 05/15/18 12:15


Sodium Chloride (Normal Saline)  1,000 mls @ 999 mls/hr IV .BOLUS ONE


   Stop: 05/15/18 13:10


   Last Admin: 05/15/18 12:35 Dose:  999 mls/hr











- Exam


General: Obtunded, Other (Pain response only)


HEENT: Pupils Equal


Lungs: Clear to Auscultation, Normal Respiratory Effort


Cardiovascular: Regular Rate, Regular Rhythm, Tachycardia


GI/Abdominal Exam: Soft, Non-Tender


Extremities: No Pedal Edema


Psy/Mental Status: No: Alert, Normal Affect, Normal Mood





- Problem List & Annotations


(1) Shock


SNOMED Code(s): 35661751


   Code(s): R57.9 - SHOCK, UNSPECIFIED   Status: Acute   Current Visit: Yes   





(2) Dehydration


SNOMED Code(s): 06720375


   Code(s): E86.0 - DEHYDRATION   Status: Acute   Current Visit: Yes   





(3) Acute kidney injury


SNOMED Code(s): 77103889


   Code(s): N17.9 - ACUTE KIDNEY FAILURE, UNSPECIFIED   Status: Acute   Current 

Visit: No   Annotation/Comment:: Baseline creatinine 0.94 in 2017.  Now at 1.0.

  Continue IVF as above.   





(4) DVT prophylaxis


SNOMED Code(s): 269281235, 908287270


   Code(s): OQC5732 -    Status: Acute   Current Visit: No   Annotation/Comment:

: Start Lovenox today. Platelets are stable at 112.  SCDs.   





(5) Hypernatremia


SNOMED Code(s): 04117882


   Code(s): E87.0 - HYPEROSMOLALITY AND HYPERNATREMIA   Status: Acute   Current 

Visit: No   





(6) Moderate mental retardation


SNOMED Code(s): 738706283, 186053257


   Code(s): F71 - MODERATE INTELLECTUAL DISABILITIES   Status: Acute   Current 

Visit: No   Annotation/Comment:: Work with staff at group home to learn how to 

meet patient's psychosocial and physical needs.     





(7) Pneumonia


SNOMED Code(s): 982876964


   Code(s): J18.9 - PNEUMONIA, UNSPECIFIED ORGANISM   Status: Acute   Current 

Visit: No   Annotation/Comment:: Completed 5 days of levaquin.   





(8) Pulmonary hypertension


SNOMED Code(s): 52293638


   Code(s): I27.20 - PULMONARY HYPERTENSION, UNSPECIFIED   Status: Acute   

Current Visit: No   





(9) Palliative care status


SNOMED Code(s): 430277484


   Code(s): Z51.5 - ENCOUNTER FOR PALLIATIVE CARE   Status: Acute   Current 

Visit: Yes   





- Problem List Review


Problem List Initiated/Reviewed/Updated: Yes





- My Orders


Last 24 Hours: 


My Active Orders





05/15/18 11:40


Admission Status [Patient Status] [ADT] Routine 





05/15/18 11:42


Sodium Chloride 0.9% [Saline Flush]   10 ml FLUSH ASDIRECTED PRN 


Blood Culture x2 Reflex Set [OM.PC] Urgent 


Peripheral IV Insertion Adult [OM.PC] Routine 





05/15/18 11:43


Code Status [Resuscitation Status] Routine 





05/15/18 12:10


Transfer Patient (Change bed) [ADT] Routine 


Urinary Catheter Insertion [Insert Urinary Catheter] [OM.PC] ONETIME 





05/15/18 12:20


Patient Status [ADT] Routine 


Bedrest Bedside Commode [RC] ASDIRECTED 


Oxygen Therapy [RC] PRN 


VTE/DVT Education [RC] Per Unit Routine 


Vital Signs [RC] 04,08,12,16,20,00 





05/15/18 12:22


Sequential Compression Device [OM.PC] Per Unit Routine 





05/15/18 12:30


CULTURE BLOOD [BC] Urgent 





05/15/18 12:35


CULTURE BLOOD [BC] Urgent 





05/15/18 12:55


URINALYSIS W/MICROSCOPIC [UA W/MICROSCOPIC] [URIN] Routine 





05/15/18 13:00


Azithromycin [Zithromax] 500 mg   Sodium Chloride 0.9% [Normal Saline] 250 ml 

IV Q24H 





05/15/18 13:09


Urinary Catheter Assessment [RC] 08,16,00 





05/15/18 13:18


Sodium Chloride 0.9% [Normal Saline] 1,000 ml IV ASDIRECTED 





05/15/18 16:00


Sodium Chloride 0.9% [Normal Saline] 1,000 ml IV ASDIRECTED 





05/15/18 21:00


Heparin Sodium   5,000 units SUBCUT Q12H 





05/15/18 Lunch


Nothing per Oral Now Diet [DIET] 














- Plan


Plan:: 


1.  Continue aggressive hydration which is helping. Her blood pressures come up 

to systolic over 100 and pulse down in the low 100s.


2. Her sodium has increased. When I believe she is adequately hydrated and her 

vitals under controlled and I will start giving her normal saline.


3. Continue antibiotics.


4. Check CMP, CBC in the a.m.


5. I will check of this patient with overnight physician.

## 2018-05-15 NOTE — PCM.HP
H&P History of Present Illness





- General


Date of Service: 05/15/18


Admit Problem/Dx: 


 Admission Diagnosis/Problem





Admission Diagnosis/Problem      Pneumonia








Source of Information: Other (Group home staff)


History Limitations: Reports: Altered Mental Status





- History of Present Illness


Initial Comments - Free Text/Narative: 


This is a 77-year-old female patient that's a group home patient brought in to 

see Alicja Oh physician's assistant.  The patient has been having no eating or 

drinking for a week and not urinating for greater than 24 hours.  They stated 

she is barely able to open her mouth and is very tired and sleepy.  She was in 

the hospital but 3 weeks ago for pneumonia and was treated and got better.  She 

had a week that she was doing good and then this began.  She can speak for cell 

but has a caregiver with her from the group home.  She had pneumonia but has 

not had any cough, fevers, nasal congestion, diarrhea, abdominal pain.  

Apparently the group home and suggested that she go on hospice.  She normally 

speaks herself but has not been able to do that recently.  Certainly talk to 

her brother and sister and they did not want hospice at this time.  He is a DNR/

DNI.








- Related Data


Allergies/Adverse Reactions: 


 Allergies











Allergy/AdvReac Type Severity Reaction Status Date / Time


 


levofloxacin [From Levaquin] Allergy  Other Verified 04/24/18 20:31











Home Medications: 


 Home Meds





Calcium Carbonate [Calcium] 1,500 mg PO DAILY 04/23/18 [History]


Cholecalciferol (Vitamin D3) [Vitamin D3] 2,000 unit PO DAILY 04/23/18 [History]


Fexofenadine [Allegra] 180 mg PO DAILY 04/23/18 [History]


Iron/Multivits,Stress Formula [Stress Formula with Iron] 1 tab PO DAILY 04/23/ 18 [History]


Nabumetone [Relafen] 750 mg PO 18 04/23/18 [History]


levETIRAcetam [Keppra] 500 mg PO 10,18 04/23/18 [History]


Furosemide [Lasix] 20 mg PO BID #30 tab 04/30/18 [Rx]











Past Medical History


HEENT History: Reports: Cataract, Macular Degeneration, Other (See Below)


Other HEENT History: ptosis of the eyelid


Cardiovascular History: Reports: High Cholesterol, Other (See Below) (CHF acute 

on chronic diastolic, heart murmur)


Other Cardiovascular History: tachacardia


Respiratory History: Reports: Pulmonary Fibrosis


Genitourinary History: Reports: Urinary Incontinence


Musculoskeletal History: Reports: Arthritis, Osteoporosis


Neurological History: Reports: Seizure, Other (See Below) (History of 

hydrocephalus as a child)


Other Neuro History: Hydrocephalus


Psychiatric History: Reports: Developmental Delay





Social & Family History





- Family History


Family Medical History: Noncontributory





- Caffeine Use


Caffeine Use: Reports: Other


Other Caffeine Use: unknown





H&P Review of Systems





- Review of Systems:


Review Of Systems: Unable To Obtain (Patient barely opens her eyes and cannot 

answer any questions.  Very obtunded.)





Exam





- Exam


Exam: See Below





- Exam


General: Obtunded


HEENT: TMs Clear, Other (Uterus membranes very dry)


Neck: Supple, Trachea Midline


Lungs: Clear to Auscultation, Normal Respiratory Effort.  No: Crackles, Rales, 

Rhonchi, Rub


Cardiovascular: Regular Rhythm, Tachycardia.  No: Systolic Murmur, Diastolic 

Murmur


GI/Abdominal Exam: Normal Bowel Sounds, Soft, Non-Tender.  No: No Distention


Back Exam: Normal Inspection


Extremities: Normal Range of Motion, No Pedal Edema


Skin: Dry


Neurological: No: Normal Speech


Neuro Extensive - Mental Status: No: Alert, Oriented x3, Normal Mood/Affect, 

Normal Cognition, Memory Intact


Neuro Extensive - Motor, Sensory, Reflexes: No: Normal Gait


Psychiatric: Other (obtunded)





- Patient Data


Imaging Impressions Last 24 hrs: 











Glucose 70 - 100 mg/dL 164 (H)


 


BUN 6 - 22 mg/dL 139 (H)


 


Creatinine 0.60 - 1.10 mg/dL 3.26 (H)


 


BUN/Creatinine Ratio 15.0 - 20.0 42.6 (H)


 


Sodium 135 - 145 meq/L 167 (HH)


 


Comments: Results rechecked  


 


Potassium 3.5 - 5.3 meq/L 5.0


 


Chloride 99 - 110 meq/L 126 (H)


 


CO2 23 - 32 meq/L 28


 


Anion Gap with K 6 - 20 meq/L 18


 


Calcium 8.5 - 10.2 mg/dL 10.6 (H)


 


Protein Total 5.5 - 8.2 g/dL 8.0


 


Albumin 3.5 - 5.0 g/dL 4.0


 


Alkaline Phosphatase 30 - 125 U/L 118


 


AST - SGOT 0 - 33 U/L 45 (H)


 


ALT - SGPT 0 - 36 U/L 39 (H)


 


Bilirubin Total <=1.2 mg/dL 0.6


 


Age Years 77


 


eGFR Non-African American >=60 mL/min/1.73m2 14 (L)


 


eGFR   














WBC 4.0 - 11.0 K/uL 16.8 (H)


 


RBC 3.80 - 5.30 M/uL 5.99 (H)


 


Hemoglobin 11.5 - 15.8 g/dL 17.7 (H)


 


Hematocrit 35.0 - 45.0 % 59.3 (H)


 


MCV 80.0 - 98.0 fL 99.0 (H)


 


MCH 25.5 - 34.0 pg 29.5


 


MCHC 31.5 - 36.5 g/dL 29.8 (L)


 


RDW-CV 11.5 - 15.5 % 19.6 (H)


 


RDW-SD 35.5 - 50.0 fl 61.8 (H)


 


Platelet Count 140 - 400 K/uL 242


 


MPV 8.5 - 12.0 fL 12.6 (H)

















- Problem List


(1) Shock


SNOMED Code(s): 72845547


   ICD Code: R57.9 - SHOCK, UNSPECIFIED   Status: Acute   Current Visit: Yes   





(2) Dehydration


SNOMED Code(s): 47812243


   ICD Code: E86.0 - DEHYDRATION   Status: Acute   Current Visit: Yes   





(3) Acute kidney injury


SNOMED Code(s): 97789095


   ICD Code: N17.9 - ACUTE KIDNEY FAILURE, UNSPECIFIED   Status: Acute   

Current Visit: No   Problem Details: Baseline creatinine 0.94 in 2017.  Now at 

1.0.  Continue IVF as above.   





(4) DVT prophylaxis


SNOMED Code(s): 413648018, 040582871


   ICD Code: ABD6453 -    Status: Acute   Current Visit: No   Problem Details: 

Start Lovenox today. Platelets are stable at 112.  SCDs.   





(5) Hypernatremia


SNOMED Code(s): 63905551


   ICD Code: E87.0 - HYPEROSMOLALITY AND HYPERNATREMIA   Status: Acute   

Current Visit: No   





(6) Moderate mental retardation


SNOMED Code(s): 517139549, 632507100


   ICD Code: F71 - MODERATE INTELLECTUAL DISABILITIES   Status: Acute   Current 

Visit: No   Problem Details: Work with staff at group home to learn how to meet 

patient's psychosocial and physical needs.     





(7) Pneumonia


SNOMED Code(s): 093903983


   ICD Code: J18.9 - PNEUMONIA, UNSPECIFIED ORGANISM   Status: Acute   Current 

Visit: No   Problem Details: Completed 5 days of levaquin.   





(8) Pulmonary hypertension


SNOMED Code(s): 09970356


   ICD Code: I27.20 - PULMONARY HYPERTENSION, UNSPECIFIED   Status: Acute   

Current Visit: No   





(9) Palliative care status


SNOMED Code(s): 058401112


   ICD Code: Z51.5 - ENCOUNTER FOR PALLIATIVE CARE   Status: Acute   Current 

Visit: Yes   


Problem List Initiated/Reviewed/Updated: Yes


Orders Last 24hrs: 


 Active Orders 24 hr











 Category Date Time Status


 


 Admission Status [Patient Status] [ADT] Routine ADT  05/15/18 11:40 Active


 


 Patient Status [ADT] Routine ADT  05/15/18 12:20 Ordered


 


 Bedrest Bedside Commode [RC] ASDIRECTED Care  05/15/18 12:20 Ordered


 


 Oxygen Therapy [RC] PRN Care  05/15/18 12:20 Ordered


 


 VTE/DVT Education [RC] Per Unit Routine Care  05/15/18 12:20 Ordered


 


 Vital Signs [RC] Q4H Care  05/15/18 12:20 Ordered


 


 Nothing per Oral Now Diet [DIET] Diet  05/15/18 Lunch Ordered


 


 COMPREHENSIVE METABOLIC PN,CMP [CHEM] Routine Lab  05/15/18 16:00 Ordered


 


 CULTURE BLOOD [BC] Urgent Lab  05/15/18 11:43 Ordered


 


 CULTURE BLOOD [BC] Urgent Lab  05/15/18 11:43 Ordered


 


 LACTIC ACID [CHEM] Routine Lab  05/15/18 11:44 Ordered


 


 URINALYSIS W/MICROSCOPIC [UA W/MICROSCOPIC] [URIN] Lab  05/15/18 11:44 Ordered





 Routine   


 


 Enoxaparin [Lovenox] Med  05/15/18 12:30 Ordered





 30 mg SUBCUT Q24H   


 


 Sodium Chloride 0.9% [Saline Flush] Med  05/15/18 11:42 Active





 10 ml FLUSH ASDIRECTED PRN   


 


 Blood Culture x2 Reflex Set [OM.PC] Urgent Oth  05/15/18 11:42 Ordered


 


 Peripheral IV Insertion Adult [OM.PC] Routine Oth  05/15/18 11:42 Ordered


 


 Sequential Compression Device [OM.PC] Per Unit Routine Oth  05/15/18 12:22 

Ordered


 


 Code Status [Resuscitation Status] Routine Resus Stat  05/15/18 11:43 Ordered








 Medication Orders





Enoxaparin Sodium (Lovenox)  30 mg SUBCUT Q24H [facility]


Sodium Chloride (Saline Flush)  10 ml FLUSH ASDIRECTED PRN


   PRN Reason: Keep Vein Open








Assessment/Plan Comment:: 


1.  Admit to the ICU.  Start with a bolus normal saline and the reevaluate.  

Patient is very ill and dehydrated.


 2.  Chest x-ray the clinic shows some pneumonia.  Somewhat was her before.  We'

ll start Rocephin 1 g medially and get to the cultures before the Rocephin.


3.  Lactic acid level and UA.  Place Aguillon catheter.


4. VTE prophylaxis with 30 mg Lovenox.


5.  Nothing by mouth.


6.  Patient is a DNR/DNI per [facility] human service staff.


7.  Repeat chem 12 at 4 PM today.

## 2018-05-16 RX ADMIN — HEPARIN SODIUM SCH UNITS: 5000 INJECTION, SOLUTION INTRAVENOUS; SUBCUTANEOUS at 08:57

## 2018-05-16 RX ADMIN — HEPARIN SODIUM SCH UNITS: 5000 INJECTION, SOLUTION INTRAVENOUS; SUBCUTANEOUS at 20:26

## 2018-05-16 NOTE — PCM.PN
- General Info


Date of Service: 05/16/18


Admission Dx/Problem (Free Text): 


Patient has her eyes open today. She does not respond when asked questions. 

Nurses report that she is alert today compared to yesterday when she was very 

obtunded.








- Patient Data


Vitals - Most Recent: 


 Last Vital Signs











Temp  97.4 F   05/16/18 04:00


 


Pulse  97   05/16/18 06:38


 


Resp  22 H  05/16/18 06:38


 


BP  98/43 L  05/16/18 06:38


 


Pulse Ox  100   05/16/18 06:38











Weight - Most Recent: 100 lb 5 oz


I&O - Last 24 Hours: 


 Intake & Output











 05/15/18 05/16/18 05/16/18





 22:59 06:59 14:59


 


Intake Total 3800 1000 


 


Output Total 352 316 


 


Balance 3448 684 











Lab Results Last 24 Hours: 


 Laboratory Results - last 24 hr











  05/15/18 05/15/18 05/15/18 Range/Units





  12:30 12:55 16:10 


 


WBC     (4.5-12.0)  X10-3/uL


 


RBC     (3.23-5.20)  x10(6)uL


 


Hgb     (11.5-15.5)  g/dL


 


Hct     (30.0-51.3)  %


 


MCV     (80-96)  fL


 


MCH     (27.7-33.6)  pg


 


MCHC     (32.2-35.4)  g/dL


 


RDW     (11.5-15.5)  %


 


Plt Count     (125-369)  X10(3)uL


 


MPV     (7.4-10.4)  fL


 


Neut % (Auto)     (46-82)  %


 


Lymph % (Auto)     (13-37)  %


 


Mono % (Auto)     (4-12)  %


 


Eos % (Auto)     (1.0-5.0)  %


 


Baso % (Auto)     (0-2)  %


 


Neut # (Auto)     (1.6-8.3)  #


 


Lymph # (Auto)     (0.6-5.0)  #


 


Mono # (Auto)     (0.0-1.3)  #


 


Eos # (Auto)     (0.0-0.8)  #


 


Baso # (Auto)     (0.0-0.2)  #


 


Sodium    176 H* D  (135-145)  mmol/L


 


Potassium    4.0  (3.5-5.3)  mmol/L


 


Chloride    133 H* D  (100-110)  mmol/L


 


Carbon Dioxide    27  (21-32)  mmol/L


 


BUN    129 H*  (7-18)  mg/dL


 


Creatinine    3.1 H*  (0.55-1.02)  mg/dL


 


Est Cr Clr Drug Dosing    TNP  


 


Estimated GFR (MDRD)    14 L  (>60)  


 


BUN/Creatinine Ratio    41.6 H  (9-20)  


 


Glucose    99  ()  mg/dL


 


Lactic Acid  5.3 H*    (0.4-2.2)  mmol/L


 


Calcium    9.1  (8.6-10.2)  mg/dL


 


Magnesium     (1.8-2.5)  mg/dL


 


Total Bilirubin    0.7  (0.1-1.3)  mg/dL


 


AST    39 H D  (5-25)  IU/L


 


ALT    41 H D  (12-36)  U/L


 


Alkaline Phosphatase    88  ()  IU/L


 


Total Protein    6.2  (6.0-8.0)  g/dL


 


Albumin    2.5 L  (3.2-4.6)  g/dL


 


Globulin    3.7  g/dL


 


Albumin/Globulin Ratio    0.7  


 


Urine Color   Yellow   (YELLOW)  


 


Urine Appearance   Slightly cloudy   (CLEAR)  


 


Urine pH   5.0   (5.0-6.5)  


 


Ur Specific Gravity   1.025   (1.010-1.025)  


 


Urine Protein   Negative   (NEGATIVE)  mg/dL


 


Urine Glucose (UA)   Normal   (NEGATIVE)  mg/dL


 


Urine Ketones   Negative   (NEGATIVE)  mg/dL


 


Urine Occult Blood   Negative   (NEGATIVE)  


 


Urine Nitrite   Negative   (NEGATIVE)  


 


Urine Bilirubin   Negative   (NEGATIVE)  


 


Urine Urobilinogen   Normal   (NEGATIVE)  mg/dL


 


Ur Leukocyte Esterase   Moderate H   (NEGATIVE)  


 


Urine RBC   0-5   (0)  


 


Urine WBC   0-5   (0)  


 


Ur Squamous Epith Cells   Few H   (NS,R,O)  


 


Amorphous Sediment   Moderate   


 


Urine Bacteria   Rare H   (NS)  


 


Hyaline Casts   Many H   (NS)  














  05/15/18 05/15/18 05/15/18 Range/Units





  16:10 21:18 21:18 


 


WBC   13.7 H   (4.5-12.0)  X10-3/uL


 


RBC   4.24   (3.23-5.20)  x10(6)uL


 


Hgb   12.9   (11.5-15.5)  g/dL


 


Hct   39.8   (30.0-51.3)  %


 


MCV   93.8   (80-96)  fL


 


MCH   30.4   (27.7-33.6)  pg


 


MCHC   32.5   (32.2-35.4)  g/dL


 


RDW   15.9 H   (11.5-15.5)  %


 


Plt Count   128   (125-369)  X10(3)uL


 


MPV   10.7 H   (7.4-10.4)  fL


 


Neut % (Auto)   78.1   (46-82)  %


 


Lymph % (Auto)   13.0   (13-37)  %


 


Mono % (Auto)   7.3   (4-12)  %


 


Eos % (Auto)   1   (1.0-5.0)  %


 


Baso % (Auto)   1   (0-2)  %


 


Neut # (Auto)   10.6 H   (1.6-8.3)  #


 


Lymph # (Auto)   1.8   (0.6-5.0)  #


 


Mono # (Auto)   1.0   (0.0-1.3)  #


 


Eos # (Auto)   0.1   (0.0-0.8)  #


 


Baso # (Auto)   0.1   (0.0-0.2)  #


 


Sodium    174 H*  (135-145)  mmol/L


 


Potassium    3.9  (3.5-5.3)  mmol/L


 


Chloride    134 H*  (100-110)  mmol/L


 


Carbon Dioxide    25  (21-32)  mmol/L


 


BUN    118 H*  (7-18)  mg/dL


 


Creatinine    2.7 H*  (0.55-1.02)  mg/dL


 


Est Cr Clr Drug Dosing    11.01  


 


Estimated GFR (MDRD)    17 L  (>60)  


 


BUN/Creatinine Ratio    43.7 H  (9-20)  


 


Glucose    91  ()  mg/dL


 


Lactic Acid     (0.4-2.2)  mmol/L


 


Calcium    8.1 L  (8.6-10.2)  mg/dL


 


Magnesium  3.2 H*    (1.8-2.5)  mg/dL


 


Total Bilirubin     (0.1-1.3)  mg/dL


 


AST     (5-25)  IU/L


 


ALT     (12-36)  U/L


 


Alkaline Phosphatase     ()  IU/L


 


Total Protein     (6.0-8.0)  g/dL


 


Albumin     (3.2-4.6)  g/dL


 


Globulin     g/dL


 


Albumin/Globulin Ratio     


 


Urine Color     (YELLOW)  


 


Urine Appearance     (CLEAR)  


 


Urine pH     (5.0-6.5)  


 


Ur Specific Gravity     (1.010-1.025)  


 


Urine Protein     (NEGATIVE)  mg/dL


 


Urine Glucose (UA)     (NEGATIVE)  mg/dL


 


Urine Ketones     (NEGATIVE)  mg/dL


 


Urine Occult Blood     (NEGATIVE)  


 


Urine Nitrite     (NEGATIVE)  


 


Urine Bilirubin     (NEGATIVE)  


 


Urine Urobilinogen     (NEGATIVE)  mg/dL


 


Ur Leukocyte Esterase     (NEGATIVE)  


 


Urine RBC     (0)  


 


Urine WBC     (0)  


 


Ur Squamous Epith Cells     (NS,R,O)  


 


Amorphous Sediment     


 


Urine Bacteria     (NS)  


 


Hyaline Casts     (NS)  














  05/16/18 05/16/18 Range/Units





  06:10 06:10 


 


WBC  10.9   (4.5-12.0)  X10-3/uL


 


RBC  4.10   (3.23-5.20)  x10(6)uL


 


Hgb  12.3   (11.5-15.5)  g/dL


 


Hct  38.1   (30.0-51.3)  %


 


MCV  92.9   (80-96)  fL


 


MCH  30.0   (27.7-33.6)  pg


 


MCHC  32.3   (32.2-35.4)  g/dL


 


RDW  15.7 H   (11.5-15.5)  %


 


Plt Count  123 L   (125-369)  X10(3)uL


 


MPV  10.6 H   (7.4-10.4)  fL


 


Neut % (Auto)  77.6   (46-82)  %


 


Lymph % (Auto)  14.1   (13-37)  %


 


Mono % (Auto)  5.5   (4-12)  %


 


Eos % (Auto)  2   (1.0-5.0)  %


 


Baso % (Auto)  0   (0-2)  %


 


Neut # (Auto)  8.5 H   (1.6-8.3)  #


 


Lymph # (Auto)  1.5   (0.6-5.0)  #


 


Mono # (Auto)  0.6   (0.0-1.3)  #


 


Eos # (Auto)  0.3   (0.0-0.8)  #


 


Baso # (Auto)  0.0   (0.0-0.2)  #


 


Sodium   168 H*  (135-145)  mmol/L


 


Potassium   3.1 L  (3.5-5.3)  mmol/L


 


Chloride   133 H*  (100-110)  mmol/L


 


Carbon Dioxide   22  (21-32)  mmol/L


 


BUN   92 H D  (7-18)  mg/dL


 


Creatinine   1.8 H  (0.55-1.02)  mg/dL


 


Est Cr Clr Drug Dosing   18.20  


 


Estimated GFR (MDRD)   27 L  (>60)  


 


BUN/Creatinine Ratio   51.1 H  (9-20)  


 


Glucose   85  ()  mg/dL


 


Lactic Acid    (0.4-2.2)  mmol/L


 


Calcium   7.5 L  (8.6-10.2)  mg/dL


 


Magnesium    (1.8-2.5)  mg/dL


 


Total Bilirubin   0.7  (0.1-1.3)  mg/dL


 


AST   44 H D  (5-25)  IU/L


 


ALT   34  D  (12-36)  U/L


 


Alkaline Phosphatase   71  ()  IU/L


 


Total Protein   5.1 L  (6.0-8.0)  g/dL


 


Albumin   2.1 L  (3.2-4.6)  g/dL


 


Globulin   3.0  g/dL


 


Albumin/Globulin Ratio   0.7  


 


Urine Color    (YELLOW)  


 


Urine Appearance    (CLEAR)  


 


Urine pH    (5.0-6.5)  


 


Ur Specific Gravity    (1.010-1.025)  


 


Urine Protein    (NEGATIVE)  mg/dL


 


Urine Glucose (UA)    (NEGATIVE)  mg/dL


 


Urine Ketones    (NEGATIVE)  mg/dL


 


Urine Occult Blood    (NEGATIVE)  


 


Urine Nitrite    (NEGATIVE)  


 


Urine Bilirubin    (NEGATIVE)  


 


Urine Urobilinogen    (NEGATIVE)  mg/dL


 


Ur Leukocyte Esterase    (NEGATIVE)  


 


Urine RBC    (0)  


 


Urine WBC    (0)  


 


Ur Squamous Epith Cells    (NS,R,O)  


 


Amorphous Sediment    


 


Urine Bacteria    (NS)  


 


Hyaline Casts    (NS)  











[facility] Results Last 24 Hours: 


 Microbiology











 05/15/18 12:35 Anaerobic Blood Culture - Final





 Blood - Venous - Lab Draw 











Med Orders - Current: 


 Current Medications





Heparin Sodium (Porcine) (Heparin Sodium)  5,000 units SUBCUT Q12H [facility]


   Last Admin: 05/15/18 21:02 Dose:  5,000 units


Azithromycin 500 mg/ Sodium (Chloride)  250 mls @ 250 mls/hr IV Q24H [facility]


   Last Admin: 05/15/18 13:34 Dose:  250 mls/hr


Sodium Chloride (Sodium Chloride 0.45%)  1,000 mls @ 100 mls/hr IV ASDIRECTED 

[facility]


   Last Admin: 05/16/18 06:00 Dose:  125 mls/hr


Sodium Chloride (Saline Flush)  10 ml FLUSH ASDIRECTED PRN


   PRN Reason: Keep Vein Open





Discontinued Medications





Ceftriaxone Sodium (Rocephin)  1,000 mg IV ONETIME ONE


   Stop: 05/15/18 12:01


   Last Admin: 05/15/18 13:24 Dose:  1,000 mg


Enoxaparin Sodium (Lovenox)  30 mg SUBCUT Q24H [facility]


Ceftriaxone Sodium 1,000 mg/ (Sodium Chloride)  50 mls @ 100 mls/hr IV ONETIME 

ONE


   Stop: 05/15/18 12:15


Sodium Chloride (Normal Saline)  1,000 mls @ 999 mls/hr IV .BOLUS ONE


   Stop: 05/15/18 13:10


   Last Admin: 05/15/18 12:35 Dose:  999 mls/hr


Sodium Chloride (Normal Saline)  1,000 mls @ 999 mls/hr IV ASDIRECTED [facility]


   Last Admin: 05/15/18 13:38 Dose:  999 mls/hr


Sodium Chloride (Normal Saline)  1,000 mls @ 250 mls/hr IV ASDIRECTED [facility]


   Last Admin: 05/15/18 15:45 Dose:  250 mls/hr


Sodium Chloride (Normal Saline)  1,000 mls @ 500 mls/hr IV ASDIRECTED [facility]


   Last Admin: 05/15/18 20:57 Dose:  500 mls/hr











- Exam


General: Other (Eyes open but not oriented, she is cooperative)


Neck: Supple


Lungs: Clear to Auscultation, Normal Respiratory Effort


Cardiovascular: Regular Rate, Regular Rhythm, No Murmurs


Extremities: No Pedal Edema


Psy/Mental Status: Alert.  No: Normal Affect, Normal Mood





- Problem List & Annotations


(1) Shock


SNOMED Code(s): 39536569


   Code(s): R57.9 - SHOCK, UNSPECIFIED   Status: Acute   Current Visit: Yes   

Annotation/Comment:: Hypovolemic   





(2) Dehydration


SNOMED Code(s): 99292225


   Code(s): E86.0 - DEHYDRATION   Status: Acute   Current Visit: Yes   





(3) Acute kidney injury


SNOMED Code(s): 09813117


   Code(s): N17.9 - ACUTE KIDNEY FAILURE, UNSPECIFIED   Status: Acute   Current 

Visit: No   Annotation/Comment:: Baseline creatinine 0.94 in 2017.  Now at 1.0.

  Continue IVF as above.   





(4) DVT prophylaxis


SNOMED Code(s): 924270709, 590156196


   Code(s): EWN4047 -    Status: Acute   Current Visit: No   Annotation/Comment:

: Start Lovenox today. Platelets are stable at 112.  SCDs.   





(5) Hypernatremia


SNOMED Code(s): 23663334


   Code(s): E87.0 - HYPEROSMOLALITY AND HYPERNATREMIA   Status: Acute   Current 

Visit: No   





(6) Moderate mental retardation


SNOMED Code(s): 962200826, 897118539


   Code(s): F71 - MODERATE INTELLECTUAL DISABILITIES   Status: Acute   Current 

Visit: No   Annotation/Comment:: Work with staff at group home to learn how to 

meet patient's psychosocial and physical needs.     





(7) Pneumonia


SNOMED Code(s): 034924412


   Code(s): J18.9 - PNEUMONIA, UNSPECIFIED ORGANISM   Status: Acute   Current 

Visit: No   Annotation/Comment:: Completed 5 days of levaquin.   





(8) Pulmonary hypertension


SNOMED Code(s): 45392253


   Code(s): I27.20 - PULMONARY HYPERTENSION, UNSPECIFIED   Status: Acute   

Current Visit: No   





(9) Palliative care status


SNOMED Code(s): 658745738


   Code(s): Z51.5 - ENCOUNTER FOR PALLIATIVE CARE   Status: Acute   Current 

Visit: Yes   





- Problem List Review


Problem List Initiated/Reviewed/Updated: Yes





- My Orders


Last 24 Hours: 


My Active Orders





05/15/18 11:40


Admission Status [Patient Status] [ADT] Routine 





05/15/18 11:42


Sodium Chloride 0.9% [Saline Flush]   10 ml FLUSH ASDIRECTED PRN 


Blood Culture x2 Reflex Set [OM.PC] Urgent 


Peripheral IV Insertion Adult [OM.PC] Routine 





05/15/18 11:43


Code Status [Resuscitation Status] Routine 





05/15/18 12:10


Transfer Patient (Change bed) [ADT] Routine 


Urinary Catheter Insertion [Insert Urinary Catheter] [OM.PC] ONETIME 





05/15/18 12:20


Patient Status [ADT] Routine 


Bedrest Bedside Commode [RC] ASDIRECTED 


Oxygen Therapy [RC] PRN 


Vital Signs [RC] 04,08,12,16,20,00 





05/15/18 12:22


Sequential Compression Device [OM.PC] Per Unit Routine 





05/15/18 12:30


CULTURE BLOOD [BC] Urgent 





05/15/18 12:35


CULTURE BLOOD [BC] Urgent 





05/15/18 12:55


URINALYSIS W/MICROSCOPIC [UA W/MICROSCOPIC] [URIN] Routine 





05/15/18 13:00


Azithromycin [Zithromax] 500 mg   Sodium Chloride 0.9% [Normal Saline] 250 ml 

IV Q24H 





05/15/18 13:09


Urinary Catheter Assessment [RC] 08,16,00 





05/15/18 21:00


Heparin Sodium   5,000 units SUBCUT Q12H 





05/15/18 22:15


Sodium Chloride 0.45% 1,000 ml IV ASDIRECTED 





05/15/18 22:51


Communication Order [RC] ASDIRECTED 





05/15/18 Lunch


Nothing per Oral Now Diet [DIET] 














- Plan


Plan:: 


1.  I believe the patient is actively hydrated as her vitals are more stable. 

Blood pressures little low but this may be normal for her since her pulse is 

now in the 70s. I changed her fluids to half-normal saline last evening. I'll 

change the rate from 125 mL to 100 mL now.


2. Up in chair the patient can tolerate it.


3. Advance the diet if the patient goes more alert.


4. Later on I will check chest x-ray report when I get it. It's not been acute 

phase reactant from her profound dehydration. Will continue IV antibodies for 

now and reassess later.


5. Continue eyes nose. Continue Aguillon catheter for now.


6.  BMP at 4 PM today

## 2018-05-17 RX ADMIN — HEPARIN SODIUM SCH UNITS: 5000 INJECTION, SOLUTION INTRAVENOUS; SUBCUTANEOUS at 20:28

## 2018-05-17 RX ADMIN — HEPARIN SODIUM SCH UNITS: 5000 INJECTION, SOLUTION INTRAVENOUS; SUBCUTANEOUS at 09:24

## 2018-05-17 NOTE — PCM.PN
- General Info


Date of Service: 05/17/18


Admission Dx/Problem (Free Text): 


Patient is more alert. Staff is feeding her. Diet is going well. Staff has no 

concerns.








- Patient Data


Vitals - Most Recent: 


 Last Vital Signs











Temp  97.8 F   05/17/18 04:00


 


Pulse  92   05/16/18 20:00


 


Resp  18   05/17/18 04:00


 


BP  96/49 L  05/17/18 04:00


 


Pulse Ox  97   05/17/18 08:33











Weight - Most Recent: 111 lb 12.8 oz


I&O - Last 24 Hours: 


 Intake & Output











 05/16/18 05/17/18 05/17/18





 22:59 06:59 14:59


 


Intake Total 835 775 


 


Output Total 325 275 


 


Balance 510 500 











Lab Results Last 24 Hours: 


 Laboratory Results - last 24 hr











  05/16/18 05/17/18 Range/Units





  16:05 06:15 


 


Sodium  167 H*  163 H*  (135-145)  mmol/L


 


Potassium  3.2 L  2.9 L  (3.5-5.3)  mmol/L


 


Chloride  132 H*  130 H*  (100-110)  mmol/L


 


Carbon Dioxide  24  21  (21-32)  mmol/L


 


BUN  78 H D  57 H D  (7-18)  mg/dL


 


Creatinine  1.7 H  1.3 H  (0.55-1.02)  mg/dL


 


Est Cr Clr Drug Dosing  19.27  25.20  mL/min


 


Estimated GFR (MDRD)  29 L  40 L  (>60)  


 


BUN/Creatinine Ratio  45.9 H  43.8 H  (9-20)  


 


Glucose  99  96  ()  mg/dL


 


Calcium  7.6 L  7.7 L  (8.6-10.2)  mg/dL











[facility] Results Last 24 Hours: 


 Microbiology











 05/15/18 12:30 Aerobic Blood Culture - Preliminary





 Blood - Venous    NO GROWTH AFTER 1 DAY





 Anaerobic Blood Culture - Preliminary





    NO GROWTH AFTER 1 DAY


 


 05/15/18 12:35 Aerobic Blood Culture - Preliminary





 Blood - Venous - Lab Draw    NO GROWTH AFTER 1 DAY





 Anaerobic Blood Culture - Final











Med Orders - Current: 


 Current Medications





Heparin Sodium (Porcine) (Heparin Sodium)  [facility]UT [facility]


   Last Admin: 05/17/18 09:24 Dose:  5,000 units


Azithromycin 500 mg/ Sodium (Chloride)  250 mls @ 250 mls/hr IV [facility]H [facility]


   Last Admin: 05/16/18 12:38 Dose:  250 mls/hr


Dextrose/Water (Dextrose 5% In Water)  1,000 mls @ 70 mls/hr IV [facility]ED [facility]


Sodium Chloride (Saline Flush)  10 ml FLUSH ASDIRECTED PRN


   PRN Reason: Keep Vein Open





Discontinued Medications





Ceftriaxone Sodium (Rocephin)  1,000 mg IV ONETIME ONE


   Stop: 05/15/18 12:01


   Last Admin: 05/15/18 13:24 Dose:  1,000 mg


Enoxaparin Sodium (Lovenox)  30 mg SUBCUT [facility]H [facility]


Ceftriaxone Sodium 1,000 mg/ (Sodium Chloride)  50 mls @ 100 mls/hr IV ONETIME 

ONE


   Stop: 05/15/18 12:15


Sodium Chloride (Normal Saline)  1,000 mls @ 999 mls/hr IV .BOLUS ONE


   Stop: 05/15/18 13:10


   Last Admin: 05/15/18 12:35 Dose:  999 mls/hr


Sodium Chloride (Normal Saline)  1,000 mls @ 999 mls/hr IV [facility]RECTED [facility]


   Last Admin: 05/15/18 13:38 Dose:  999 mls/hr


Sodium Chloride (Normal Saline)  1,000 mls @ 250 mls/hr IV [facility]ED [facility]


   Last Admin: 05/15/18 15:45 Dose:  250 mls/hr


Sodium Chloride (Normal Saline)  1,000 mls @ 500 mls/hr IV [facility]RECTED [facility]


   Last Admin: 05/15/18 20:57 Dose:  500 mls/hr


Sodium Chloride (Sodium Chloride 0.45%)  1,000 mls @ 100 mls/hr IV ASDIRECTED 

[facility]


   Last Admin: 05/17/18 02:30 Dose:  100 mls/hr











- Exam


General: Alert, Cooperative.  No: Oriented


Lungs: Clear to Auscultation, Normal Respiratory Effort


Cardiovascular: Regular Rate, Regular Rhythm, No Murmurs


GI/Abdominal Exam: Normal Bowel Sounds


Extremities: No Pedal Edema





- Problem List & Annotations


(1) Shock


SNOMED Code(s): 40773724


   Code(s): R57.9 - SHOCK, UNSPECIFIED   Status: Acute   Current Visit: Yes   

Annotation/Comment:: Hypovolemic   





(2) Dehydration


SNOMED Code(s): 10383137


   Code(s): E86.0 - DEHYDRATION   Status: Acute   Current Visit: Yes   





(3) Acute kidney injury


SNOMED Code(s): 43454239


   Code(s): N17.9 - ACUTE KIDNEY FAILURE, UNSPECIFIED   Status: Acute   Current 

Visit: No   Annotation/Comment:: Baseline creatinine 0.94 in 2017.  Now at 1.0.

  Continue IVF as above.   





(4) DVT prophylaxis


SNOMED Code(s): 439614742, 791997816


   Code(s): SSG0186 -    Status: Acute   Current Visit: No   Annotation/Comment:

: Start Lovenox today. Platelets are stable at 112.  SCDs.   





(5) Hypernatremia


SNOMED Code(s): 56045467


   Code(s): E87.0 - HYPEROSMOLALITY AND HYPERNATREMIA   Status: Acute   Current 

Visit: No   





(6) Moderate mental retardation


SNOMED Code(s): 971501322, 270057429


   Code(s): F71 - MODERATE INTELLECTUAL DISABILITIES   Status: Acute   Current 

Visit: No   Annotation/Comment:: Work with staff at group home to learn how to 

meet patient's psychosocial and physical needs.     





(7) Pulmonary hypertension


SNOMED Code(s): 65280373


   Code(s): I27.20 - PULMONARY HYPERTENSION, UNSPECIFIED   Status: Acute   

Current Visit: No   





(8) Palliative care status


SNOMED Code(s): 973716829


   Code(s): Z51.5 - ENCOUNTER FOR PALLIATIVE CARE   Status: Acute   Current 

Visit: Yes   





- Problem List Review


Problem List Initiated/Reviewed/Updated: Yes





- My Orders


Last 24 Hours: 


My Active Orders





05/16/18 09:41


Transfer Patient (Change bed) [ADT] Routine 





05/16/18 Lunch


Regular Diet [DIET] 





05/17/18 08:05


DC Aguillon Catheter [Urinary Catheter Removal] [RC] Per Unit Routine 





05/17/18 09:45


Dextrose 5% in Water 1,000 ml IV ASDIRECTED 





05/17/18 16:00


SODIUM,NA [CHEM] Routine 





05/18/18 06:00


BASIC METABOLIC PANEL,BMP [CHEM] AM 














- Plan


Plan:: 


1.  Stop Aguillon catheter and telemetry.


2. Stop normal saline.


3. D5 water at a rate of 70 mL an hour


4. Sodium at 4 PM and a BMP in a.m.4.


5. DC IV antibiotics.

## 2018-05-18 RX ADMIN — HEPARIN SODIUM SCH UNITS: 5000 INJECTION, SOLUTION INTRAVENOUS; SUBCUTANEOUS at 09:20

## 2018-05-18 RX ADMIN — AMOXICILLIN AND CLAVULANATE POTASSIUM SCH TAB: 500; 125 TABLET, FILM COATED ORAL at 20:17

## 2018-05-18 RX ADMIN — AMOXICILLIN AND CLAVULANATE POTASSIUM SCH TAB: 500; 125 TABLET, FILM COATED ORAL at 11:53

## 2018-05-18 RX ADMIN — HEPARIN SODIUM SCH UNITS: 5000 INJECTION, SOLUTION INTRAVENOUS; SUBCUTANEOUS at 20:17

## 2018-05-18 NOTE — CR
INDICATION:  Followup pneumonia.



CHEST:  An AP upright portable view of the chest 05/18/2018 was compared with 04
/29/2018 and 04/23/2018,  



Heart appeared somewhat enlarged.  The aorta is tortuous with calcification in 
the arch.  Prominent pulmonary arteries are again noted, compatible with 
pulmonary hypertension - correlate clinically.



Heavy markings are noted at the lung bases, possibly on the basis of fibrosis, 
although pneumonia and additionally pleuritis on the right (blunting of the 
right costophrenic angle) cannot be excluded.  



No gross evidence of CHF is identified.  However, interstitial markings are 
prominent, and while likely they are fibrotic in nature, other etiologies, such 
as edema, although less likely, cannot be excluded entirely.



No gross consolidating pneumonia was identified.  



IMPRESSION:  

1.  Increasing infiltration suggested at the right lung base with stable 
appearance at the left lung base, except for left costophrenic angle, where 
slight increased infiltrate is suggested.  Bibasilar pneumonia and pleuritis on 
the right are suggested, possibly superimposed on fibrotic changes - correlate 
clinically.

2.  ASHD with cardiomegaly.

3.  Prominent pulmonary arteries suggesting the possibility of pulmonary 
hypertension.

4.  Interstitial markings, most likely fibrotic in nature.  

MTDD

## 2018-05-18 NOTE — PN
DATE SEEN:  05/18/2018

 

SUBJECTIVE:  Yessy Keller is a 77-year-old  female with symptomatic

hypernatremia.

 

Markedly elevated sodium.  It has gone from 168, 167, 163, 159, and 152 today.

Chloride 133, 132, 130, and 119.

 

Of great consequence, GFR is markedly impaired upon coming in, 27, 29, 40, and

today 53.

 

IV fluids are presently in place.

 

Chest x-ray, suspicion for worsening right lower lobe pneumonia.

 

Intermittently awake, otherwise appropriate.

 

PHYSICAL EXAMINATION:  VITAL SIGNS:  BP 94/40, 56 is the mean, 73 is the pulse,

weight 52.254 kg.  GENERAL:  Appears to be comfortable.  O2 satisfactory.  NECK:

Benign.  Thyroid small.  CHEST:  Decreased breath sounds.  Coarse rhonchi

throughout.  HEART:  Regular.  ABDOMEN:  Benign.

 

ASSESSMENT:

1. Pneumonia persists.

2. Hypernatremic.

 

PLAN:  Diabetes insipidus may be a new originating caution.  We will discontinue

IV fluids.  Complementary care and well-being.  Encourage oral fluids.  Proceed

accordingly.  Recheck lipids.  To recheck electrolytes in the morning.

 

Job#: [id number]

DD: 05/18/2018 1058

DT: 05/18/2018 1255 VENKATESH/ERA

## 2018-05-19 RX ADMIN — HEPARIN SODIUM SCH UNITS: 5000 INJECTION, SOLUTION INTRAVENOUS; SUBCUTANEOUS at 09:00

## 2018-05-19 RX ADMIN — HEPARIN SODIUM SCH UNITS: 5000 INJECTION, SOLUTION INTRAVENOUS; SUBCUTANEOUS at 21:02

## 2018-05-19 RX ADMIN — AMOXICILLIN AND CLAVULANATE POTASSIUM SCH TAB: 500; 125 TABLET, FILM COATED ORAL at 21:02

## 2018-05-19 RX ADMIN — AMOXICILLIN AND CLAVULANATE POTASSIUM SCH TAB: 500; 125 TABLET, FILM COATED ORAL at 09:00

## 2018-05-20 RX ADMIN — AMOXICILLIN AND CLAVULANATE POTASSIUM SCH TAB: 500; 125 TABLET, FILM COATED ORAL at 20:24

## 2018-05-20 RX ADMIN — HEPARIN SODIUM SCH UNITS: 5000 INJECTION, SOLUTION INTRAVENOUS; SUBCUTANEOUS at 20:24

## 2018-05-20 RX ADMIN — HEPARIN SODIUM SCH UNITS: 5000 INJECTION, SOLUTION INTRAVENOUS; SUBCUTANEOUS at 08:54

## 2018-05-20 RX ADMIN — AMOXICILLIN AND CLAVULANATE POTASSIUM SCH TAB: 500; 125 TABLET, FILM COATED ORAL at 08:53

## 2018-05-20 NOTE — PN
DATE SEEN:  05/20/2018

 

SUBJECTIVE:  Yessy Keller is a 77-year-old  female, admitted with

acute respiratory discomfort, marked hypernatremia, and concerns about a

metabolic reason.

 

Central ADH, central diabetes insipidus is a clinical concern.

 

Normalization has occurred.  Serum and urine osmolality pending at the time of

this dictation. 05/09/2018 sodium 148, potassium 3.4, chloride 116.  GFR had

been depressed 29, 40, 53, and now 60.

 

Intake of fluids has been inadequate.

 

OBJECTIVE:  VITAL SIGNS:  39.5, 177/37, mean pressure 50, 20 is the respiration,

94% on 1.5 L.  GENERAL:  Really noncommunicative.  No obvious distress.  NECK:

Benign.  Thyroid small.  CHEST:  Clear in all lung fields, though poor exchange.

HEART:  Distant heart sounds.  ABDOMEN:  Benign.

 

IMPRESSION:

1. Hypernatremia, resolved.

2. Inadequate hydration.

 

PLAN:  Needs to be simply on the basis of inadequate fluid.  Studies to be

performed today.  Plan discharge tomorrow.

 

Job#: [id number]

DD: 05/20/2018 1007

DT: 05/20/2018 1146 /ERA

## 2018-05-21 RX ADMIN — AMOXICILLIN AND CLAVULANATE POTASSIUM SCH TAB: 500; 125 TABLET, FILM COATED ORAL at 08:33

## 2018-05-21 RX ADMIN — HEPARIN SODIUM SCH UNITS: 5000 INJECTION, SOLUTION INTRAVENOUS; SUBCUTANEOUS at 08:33

## 2018-05-21 NOTE — PN
DATE SEEN:  05/19/2018

 

HISTORY OF PRESENT ILLNESS:  Yessy Keller is a 77-year-old  female,

who presented with complicated hypernatremia.  Second visit for similar

concerns.  Marked improvement in the meantime.  Metabolic panel today, 148

sodium, 3.4 potassium, 116 chloride.  GFR now has improved to greater than 60.

 

Intravenous line was discontinued due to infiltration yesterday.  No clinical

change of consequence.  Recent chest x-ray report noted antibiotic therapy on

board.

 

PHYSICAL EXAMINATION:  Vital Signs:  52.6 kg, 36.4 degrees Fahrenheit, pulse is

83, 102/44.  General:  Pretty nonawake.  Appears otherwise comfortable, in no

respiratory distress.  Chest:  Decreased breath sounds, both bases. Heart:

Distant heart sounds.  No ectopy or murmur.  Abdomen:  Benign.

 

ASSESSMENT:

1. Pneumonia.

2. Hypernatremia.

 

PLAN:  Work up of high ADH is a concern, urine osmolality, serum osmolality,

cannot measure 24-hour urine output.  Continue antibiotic therapy, care for the

weekend given, discharge status to group home.

 

Job#: [id number]

DD: 05/19/2018 1100

DT: 05/19/2018 1147 /ERA

## 2018-05-22 NOTE — DISCH
DISCHARGE DATE:  05/21/2018

 

REASON FOR VISIT:  Complicated hypernatremia and hyperchloremia with secondary

associated pneumonia.

 

SECONDARY DIAGNOSES:

1. Profound mental retardation.

2. Pulmonary fibrosis.

3. Arthritis.

4. Osteoporosis.

5. History of hydrocephalus.

 

HISTORY OF PRESENT ILLNESS:  Yessy Keller is a 77-year-old  female,

a group home resident, brought to [facility] by Dr. Gabriel/Dr. Vinay shabazz.

Not eating.  Not drinking.  Voiding, with little activity, markedly dehydrated

hospitalization.

 

Previous to this admission, hospitalized for 3 weeks for pneumonia.

 

On admission, vital signs are stable.  Intravenous fluids and intravenous

antibiotics were provided.  Free water and D5W were given to normalize sodium,

which upon admission was 170.  Followup have returned reasonably normal at 148,

152.  Potassium 3.3, and chloride 117.  GFR has varied from 40 to 48.

 

X-ray revealed, though difficult to interpret, right-sided pneumonia.

 

Initially, IV antibiotics were given, but switched to oral Augmentin.  Response

was satisfactory.

 

Intake of fluids was a clinical concern.  IV fluids of course cannot be

maintained forever.

 

PHYSICAL EXAMINATION:  VITAL SIGNS:  At the time of discharge exam, vital signs

52 kg, 98 is the pulse, blood pressure 94/41, and 94% 2 L per exam.  GENERAL:

Noncommunicative but more wakeful this morning.  NECK:  Benign.  Thyroid is

small.  CHEST:  Coarse rhonchi.  Good air exchange.  HEART:  Distant heart

sounds.  Grade 2/6 systolic ejection murmur.  ABDOMEN:  Benign.  The surgical

scar has well healed.  EXTREMITIES:  Well perfused.

 

ASSESSMENT:

1. Complicated hypernatremia.

2. Pneumonia.

 

PLAN:  Discharge home on Augmentin and fluids.  Strong instructions were given

for adequate fluids. Complementary care and well being.

 

SURGICAL PROCEDURES:  None.

 

CONSULTATIONS:  None.

 

Job#: [id number]

DD: 05/21/2018 [id number]

DT: 05/21/2018 2306 /ERA